# Patient Record
Sex: FEMALE | Race: WHITE | NOT HISPANIC OR LATINO | Employment: STUDENT | URBAN - METROPOLITAN AREA
[De-identification: names, ages, dates, MRNs, and addresses within clinical notes are randomized per-mention and may not be internally consistent; named-entity substitution may affect disease eponyms.]

---
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
## 2021-08-11 ENCOUNTER — APPOINTMENT (OUTPATIENT)
Dept: ULTRASOUND IMAGING | Facility: HOSPITAL | Age: 21
End: 2021-08-11

## 2021-08-11 ENCOUNTER — APPOINTMENT (OUTPATIENT)
Dept: CT IMAGING | Facility: HOSPITAL | Age: 21
End: 2021-08-11

## 2021-08-11 ENCOUNTER — HOSPITAL ENCOUNTER (EMERGENCY)
Facility: HOSPITAL | Age: 21
Discharge: HOME | End: 2021-08-11
Attending: EMERGENCY MEDICINE | Admitting: EMERGENCY MEDICINE

## 2021-08-11 VITALS
TEMPERATURE: 98.4 F | OXYGEN SATURATION: 100 % | HEART RATE: 88 BPM | SYSTOLIC BLOOD PRESSURE: 105 MMHG | DIASTOLIC BLOOD PRESSURE: 63 MMHG | RESPIRATION RATE: 16 BRPM

## 2021-08-11 DIAGNOSIS — R10.31 RIGHT LOWER QUADRANT ABDOMINAL PAIN: Primary | ICD-10-CM

## 2021-08-11 LAB
ALBUMIN SERPL-MCNC: 4.2 G/DL (ref 3.5–5.2)
ALBUMIN/GLOB SERPL: 1.5 G/DL
ALP SERPL-CCNC: 104 U/L (ref 39–117)
ALT SERPL W P-5'-P-CCNC: 19 U/L (ref 1–33)
ANION GAP SERPL CALCULATED.3IONS-SCNC: 9.8 MMOL/L (ref 5–15)
AST SERPL-CCNC: 24 U/L (ref 1–32)
BASOPHILS # BLD AUTO: 0.05 10*3/MM3 (ref 0–0.2)
BASOPHILS NFR BLD AUTO: 0.5 % (ref 0–1.5)
BILIRUB SERPL-MCNC: 0.4 MG/DL (ref 0–1.2)
BILIRUB UR QL STRIP: NEGATIVE
BUN SERPL-MCNC: 11 MG/DL (ref 6–20)
BUN/CREAT SERPL: 13.4 (ref 7–25)
CALCIUM SPEC-SCNC: 9.3 MG/DL (ref 8.6–10.5)
CHLORIDE SERPL-SCNC: 102 MMOL/L (ref 98–107)
CLARITY UR: ABNORMAL
CO2 SERPL-SCNC: 26.2 MMOL/L (ref 22–29)
COLOR UR: YELLOW
CREAT SERPL-MCNC: 0.82 MG/DL (ref 0.57–1)
DEPRECATED RDW RBC AUTO: 38.1 FL (ref 37–54)
EOSINOPHIL # BLD AUTO: 0.08 10*3/MM3 (ref 0–0.4)
EOSINOPHIL NFR BLD AUTO: 0.8 % (ref 0.3–6.2)
ERYTHROCYTE [DISTWIDTH] IN BLOOD BY AUTOMATED COUNT: 12 % (ref 12.3–15.4)
GFR SERPL CREATININE-BSD FRML MDRD: 89 ML/MIN/1.73
GLOBULIN UR ELPH-MCNC: 2.8 GM/DL
GLUCOSE SERPL-MCNC: 84 MG/DL (ref 65–99)
GLUCOSE UR STRIP-MCNC: NEGATIVE MG/DL
HCG SERPL QL: NEGATIVE
HCT VFR BLD AUTO: 40 % (ref 34–46.6)
HGB BLD-MCNC: 13.7 G/DL (ref 12–15.9)
HGB UR QL STRIP.AUTO: NEGATIVE
IMM GRANULOCYTES # BLD AUTO: 0.04 10*3/MM3 (ref 0–0.05)
IMM GRANULOCYTES NFR BLD AUTO: 0.4 % (ref 0–0.5)
KETONES UR QL STRIP: ABNORMAL
LEUKOCYTE ESTERASE UR QL STRIP.AUTO: NEGATIVE
LYMPHOCYTES # BLD AUTO: 2.35 10*3/MM3 (ref 0.7–3.1)
LYMPHOCYTES NFR BLD AUTO: 22.4 % (ref 19.6–45.3)
MCH RBC QN AUTO: 30.2 PG (ref 26.6–33)
MCHC RBC AUTO-ENTMCNC: 34.3 G/DL (ref 31.5–35.7)
MCV RBC AUTO: 88.1 FL (ref 79–97)
MONOCYTES # BLD AUTO: 0.86 10*3/MM3 (ref 0.1–0.9)
MONOCYTES NFR BLD AUTO: 8.2 % (ref 5–12)
NEUTROPHILS NFR BLD AUTO: 67.7 % (ref 42.7–76)
NEUTROPHILS NFR BLD AUTO: 7.1 10*3/MM3 (ref 1.7–7)
NITRITE UR QL STRIP: NEGATIVE
NRBC BLD AUTO-RTO: 0 /100 WBC (ref 0–0.2)
PH UR STRIP.AUTO: 6.5 [PH] (ref 5–8)
PLATELET # BLD AUTO: 323 10*3/MM3 (ref 140–450)
PMV BLD AUTO: 8.6 FL (ref 6–12)
POTASSIUM SERPL-SCNC: 3.7 MMOL/L (ref 3.5–5.2)
PROT SERPL-MCNC: 7 G/DL (ref 6–8.5)
PROT UR QL STRIP: NEGATIVE
RBC # BLD AUTO: 4.54 10*6/MM3 (ref 3.77–5.28)
SODIUM SERPL-SCNC: 138 MMOL/L (ref 136–145)
SP GR UR STRIP: >=1.03 (ref 1–1.03)
UROBILINOGEN UR QL STRIP: ABNORMAL
WBC # BLD AUTO: 10.48 10*3/MM3 (ref 3.4–10.8)

## 2021-08-11 PROCEDURE — 25010000002 IOPAMIDOL 61 % SOLUTION: Performed by: EMERGENCY MEDICINE

## 2021-08-11 PROCEDURE — 84703 CHORIONIC GONADOTROPIN ASSAY: CPT | Performed by: PHYSICIAN ASSISTANT

## 2021-08-11 PROCEDURE — 76856 US EXAM PELVIC COMPLETE: CPT

## 2021-08-11 PROCEDURE — 25010000002 KETOROLAC TROMETHAMINE PER 15 MG: Performed by: EMERGENCY MEDICINE

## 2021-08-11 PROCEDURE — 74177 CT ABD & PELVIS W/CONTRAST: CPT

## 2021-08-11 PROCEDURE — 81003 URINALYSIS AUTO W/O SCOPE: CPT | Performed by: PHYSICIAN ASSISTANT

## 2021-08-11 PROCEDURE — 96374 THER/PROPH/DIAG INJ IV PUSH: CPT

## 2021-08-11 PROCEDURE — 99283 EMERGENCY DEPT VISIT LOW MDM: CPT

## 2021-08-11 PROCEDURE — 85025 COMPLETE CBC W/AUTO DIFF WBC: CPT | Performed by: PHYSICIAN ASSISTANT

## 2021-08-11 PROCEDURE — 93976 VASCULAR STUDY: CPT

## 2021-08-11 PROCEDURE — 80053 COMPREHEN METABOLIC PANEL: CPT | Performed by: PHYSICIAN ASSISTANT

## 2021-08-11 RX ORDER — SODIUM CHLORIDE 0.9 % (FLUSH) 0.9 %
10 SYRINGE (ML) INJECTION AS NEEDED
Status: DISCONTINUED | OUTPATIENT
Start: 2021-08-11 | End: 2021-08-11 | Stop reason: HOSPADM

## 2021-08-11 RX ORDER — KETOROLAC TROMETHAMINE 15 MG/ML
15 INJECTION, SOLUTION INTRAMUSCULAR; INTRAVENOUS ONCE
Status: COMPLETED | OUTPATIENT
Start: 2021-08-11 | End: 2021-08-11

## 2021-08-11 RX ADMIN — KETOROLAC TROMETHAMINE 15 MG: 15 INJECTION, SOLUTION INTRAMUSCULAR; INTRAVENOUS at 18:35

## 2021-08-11 RX ADMIN — IOPAMIDOL 100 ML: 612 INJECTION, SOLUTION INTRAVENOUS at 17:51

## 2021-08-11 RX ADMIN — SODIUM CHLORIDE, PRESERVATIVE FREE 10 ML: 5 INJECTION INTRAVENOUS at 18:35

## 2021-08-11 RX ADMIN — SODIUM CHLORIDE 1000 ML: 9 INJECTION, SOLUTION INTRAVENOUS at 17:00

## 2021-08-11 NOTE — ED TRIAGE NOTES
Pt reports right lower abd pain that started Sunday. Pt was seen at her PCP and was sent here to rule out appendicitis.     Pt was wearing a mask during assessment.  This RN wore appropriate PPE

## 2021-08-11 NOTE — ED PROVIDER NOTES
Pt presents to the ED c/o  right lower quadrant abdominal pain onset 5 days ago.  She initially had some nausea and vomiting.  She denies diarrhea.  She believes she may have had a fever on Sunday but did not take her temperature.  She denies vaginal bleeding.     On exam,   Awake and alert, no acute distress.  There is focal right lower quadrant tenderness without rebound or guarding.     Plan: CT abdomen reviewed by me in PACS and demonstrates normal appendix.  I explained patient that we will obtain a pelvic ultrasound to evaluate for possible ovarian pathology contributing to her symptoms.      I wore a mask, face shield, and gloves during this patient encounter.  Patient also wearing a surgical mask.  Hand hygeine performed before and after seeing the patient.     Attestation:  The APOLONIA and I have discussed this patient's history, physical exam, and treatment plan.  I have reviewed the documentation and personally had a face to face interaction with the patient. I affirm the documentation and agree with the treatment and plan.  The attached note describes my personal findings.            Abhishek Trammell MD  08/11/21 9774

## 2021-08-11 NOTE — ED PROVIDER NOTES
EMERGENCY DEPARTMENT ENCOUNTER    Room Number:  19/19  Date of encounter:  8/11/2021  PCP: Diana Askew MD  Historian: Patient, mother      I used full protective equipment while examining this patient.  This includes face mask, gloves and protective eyewear.  I washed my hands before entering the room and immediately upon leaving the room      HPI:  Chief Complaint: Abdominal pain  A complete HPI/ROS/PMH/PSH/SH/FH are unobtainable due to: Nothing    Context: Monica Cerna is a 21 y.o. female who presents to the ED c/o 5-day history of gradual onset abdominal pain.  Patient states the pain initially was periumbilical in nature, and has since migrated to the right lower quadrant.  The pain is moderate, intermittent, dull in nature.  Patient has a associated intermittent nausea and vomiting.  She denies any dysuria, diarrhea, vaginal bleeding, vaginal discharge.  Patient denies any previous abdominal surgeries.  She was initially seen at her primary care doctor's office and sent here for further evaluation.    Review of Medical Records  I reviewed patient's last office visit from orthopedics.  Patient was seen on 8/4/2021 for right knee arthroscopy.    PAST MEDICAL HISTORY  Active Ambulatory Problems     Diagnosis Date Noted   • No Active Ambulatory Problems     Resolved Ambulatory Problems     Diagnosis Date Noted   • No Resolved Ambulatory Problems     No Additional Past Medical History         PAST SURGICAL HISTORY  No past surgical history on file.      FAMILY HISTORY  No family history on file.      SOCIAL HISTORY  Social History     Socioeconomic History   • Marital status: Single     Spouse name: Not on file   • Number of children: Not on file   • Years of education: Not on file   • Highest education level: Not on file         ALLERGIES  Patient has no known allergies.        REVIEW OF SYSTEMS  All systems reviewed and negative except for those discussed in HPI.       PHYSICAL EXAM    I have reviewed the  triage vital signs and nursing notes.    ED Triage Vitals   Temp Heart Rate Resp BP SpO2   08/11/21 1523 08/11/21 1523 08/11/21 1523 08/11/21 1525 08/11/21 1523   98.4 °F (36.9 °C) 113 16 101/76 98 %      Temp src Heart Rate Source Patient Position BP Location FiO2 (%)   08/11/21 1523 08/11/21 1523 -- 08/11/21 1525 --   Tympanic Monitor  Left arm        Physical Exam  GENERAL: Alert, oriented, not distressed  HENT: head atraumatic, no nuchal rigidity  EYES: no scleral icterus, EOMI  CV: regular rhythm, regular rate, no murmur  RESPIRATORY: normal effort, CTA  ABDOMEN: soft, moderate right lower quadrant abdominal tenderness without guarding or rebound.  Tender at McBurney's point.  Normal bowel sounds.  No CVA tenderness.  MUSCULOSKELETAL: no deformity, FROM, no calf swelling or tenderness  NEURO: alert, moves all extremities, follows commands  SKIN: warm, dry        LAB RESULTS  Recent Results (from the past 24 hour(s))   Comprehensive Metabolic Panel    Collection Time: 08/11/21  4:19 PM    Specimen: Blood   Result Value Ref Range    Glucose 84 65 - 99 mg/dL    BUN 11 6 - 20 mg/dL    Creatinine 0.82 0.57 - 1.00 mg/dL    Sodium 138 136 - 145 mmol/L    Potassium 3.7 3.5 - 5.2 mmol/L    Chloride 102 98 - 107 mmol/L    CO2 26.2 22.0 - 29.0 mmol/L    Calcium 9.3 8.6 - 10.5 mg/dL    Total Protein 7.0 6.0 - 8.5 g/dL    Albumin 4.20 3.50 - 5.20 g/dL    ALT (SGPT) 19 1 - 33 U/L    AST (SGOT) 24 1 - 32 U/L    Alkaline Phosphatase 104 39 - 117 U/L    Total Bilirubin 0.4 0.0 - 1.2 mg/dL    eGFR Non African Amer 89 >60 mL/min/1.73    Globulin 2.8 gm/dL    A/G Ratio 1.5 g/dL    BUN/Creatinine Ratio 13.4 7.0 - 25.0    Anion Gap 9.8 5.0 - 15.0 mmol/L   hCG, Serum, Qualitative    Collection Time: 08/11/21  4:19 PM    Specimen: Blood   Result Value Ref Range    HCG Qualitative Negative Negative   CBC Auto Differential    Collection Time: 08/11/21  4:19 PM    Specimen: Blood   Result Value Ref Range    WBC 10.48 3.40 - 10.80  10*3/mm3    RBC 4.54 3.77 - 5.28 10*6/mm3    Hemoglobin 13.7 12.0 - 15.9 g/dL    Hematocrit 40.0 34.0 - 46.6 %    MCV 88.1 79.0 - 97.0 fL    MCH 30.2 26.6 - 33.0 pg    MCHC 34.3 31.5 - 35.7 g/dL    RDW 12.0 (L) 12.3 - 15.4 %    RDW-SD 38.1 37.0 - 54.0 fl    MPV 8.6 6.0 - 12.0 fL    Platelets 323 140 - 450 10*3/mm3    Neutrophil % 67.7 42.7 - 76.0 %    Lymphocyte % 22.4 19.6 - 45.3 %    Monocyte % 8.2 5.0 - 12.0 %    Eosinophil % 0.8 0.3 - 6.2 %    Basophil % 0.5 0.0 - 1.5 %    Immature Grans % 0.4 0.0 - 0.5 %    Neutrophils, Absolute 7.10 (H) 1.70 - 7.00 10*3/mm3    Lymphocytes, Absolute 2.35 0.70 - 3.10 10*3/mm3    Monocytes, Absolute 0.86 0.10 - 0.90 10*3/mm3    Eosinophils, Absolute 0.08 0.00 - 0.40 10*3/mm3    Basophils, Absolute 0.05 0.00 - 0.20 10*3/mm3    Immature Grans, Absolute 0.04 0.00 - 0.05 10*3/mm3    nRBC 0.0 0.0 - 0.2 /100 WBC   Urinalysis With Microscopic If Indicated (No Culture) - Urine, Clean Catch    Collection Time: 08/11/21  8:27 PM    Specimen: Urine, Clean Catch   Result Value Ref Range    Color, UA Yellow Yellow, Straw    Appearance, UA Cloudy (A) Clear    pH, UA 6.5 5.0 - 8.0    Specific Gravity, UA >=1.030 1.005 - 1.030    Glucose, UA Negative Negative    Ketones, UA 40 mg/dL (2+) (A) Negative    Bilirubin, UA Negative Negative    Blood, UA Negative Negative    Protein, UA Negative Negative    Leuk Esterase, UA Negative Negative    Nitrite, UA Negative Negative    Urobilinogen, UA 0.2 E.U./dL 0.2 - 1.0 E.U./dL       Ordered the above labs and independently reviewed the results.        RADIOLOGY  US Pelvis Complete, US Testicular or Ovarian Vascular Limited    Result Date: 8/11/2021  US PELVIS COMPLETE-, US TESTICULAR OR OVARIAN VASCULAR LIMITED-  INDICATIONS: Abdominal pain  TECHNIQUE: Transabdominal pelvic ultrasound with Doppler assessment of the ovaries  COMPARISON: Correlated with CT exam from 08/11/2021  FINDINGS:  The retroverted uterus measures 8.4 x 3.3 x 6.1 cm. Endometrial  thickness was measured at 1 mm. The cervix appears unremarkable.  The ovaries show normal vascularity. The right ovary measures 3.7 x 2.3 x 1.8 cm, and appears unremarkable. The left ovary measures 4.0 x 2.7 x 2.3 cm, and contains a 2.4 cm cyst.  Small pelvic free fluid at the right adnexa.       Small right adnexal free fluid. Left ovarian cyst. Otherwise unremarkable pelvic ultrasound.  This report was finalized on 8/11/2021 7:21 PM by Dr. Favio Pereyra M.D.      CT Abdomen Pelvis With Contrast    Result Date: 8/11/2021  CT ABDOMEN PELVIS W CONTRAST-  CLINICAL HISTORY: Right lower quadrant pain. Vomiting.  TECHNIQUE: Spiral CT images were acquired through the abdomen and pelvis with IV contrast only and were reconstructed in 3 mm thick slices.  Radiation dose reduction techniques were utilized, including automated exposure control and exposure modulation based on body size.  COMPARISON: None  FINDINGS: The liver, spleen, pancreas, kidneys, and adrenal glands appear within normal limits. The stomach and small and large bowel are unremarkable. There is no bowel distention. The appendix is identified, and appears normal. The uterus and ovaries appear within normal limits.  IMPRESSIONS: Normal CT scan of the abdomen and pelvis.  This report was finalized on 8/11/2021 6:26 PM by Dr. Ronak Johnson M.D.        I ordered the above noted radiological studies. Reviewed by me and discussed with radiologist.  See dictation for official radiology interpretation.      MEDICATIONS GIVEN IN ER    Medications   sodium chloride 0.9 % flush 10 mL (10 mL Intravenous Given 8/11/21 1835)   sodium chloride 0.9 % bolus 1,000 mL (0 mL Intravenous Stopped 8/11/21 1730)   iopamidol (ISOVUE-300) 61 % injection 100 mL (100 mL Intravenous Given 8/11/21 1751)   ketorolac (TORADOL) injection 15 mg (15 mg Intravenous Given 8/11/21 1835)         PROGRESS, DATA ANALYSIS, CONSULTS, AND MEDICAL DECISION MAKING    All labs have been  independently reviewed by me.  All radiology studies have been reviewed by me and discussed with radiologist dictating the report.   EKG's independently viewed and interpreted by me.  Discussion below represents my analysis of pertinent findings related to patient's condition, differential diagnosis, treatment plan and final disposition.    I have discussed case with Dr. Trammell, emergency room physician.  He has performed his own bedside examination and agrees with treatment plan.    ED Course as of Aug 11 2210   Wed Aug 11, 2021   1614 Patient presents with 4-day history of right lower quadrant abdominal pain, vomiting.  Differential diagnoses include not limited to acute appendicitis, colitis, ureterolithiasis, UTI, epiploic appendagitis, acute cholecystitis.    Patient declines pain medicine at this time.    [EE]   1634 WBC: 10.48 [EE]   1634 Hemoglobin: 13.7 [EE]   1712 Creatinine: 0.82 [EE]   1712 Total Bilirubin: 0.4 [EE]   1712 HCG Qualitative: Negative [EE]   1830 I discussed CT findings with Dr. Chan.  Patient has no acute intra-abdominal abnormalities.  No evidence of appendicitis.    Given this plan to order ultrasound to rule out ovarian torsion/cyst.    [EE]   2109 Ultrasound and urine showed no acute abnormalities.  No clear etiology patient symptoms.  She has been given strict return to ER directions.  Patient and family in agreement with treatment plan.    [EE]      ED Course User Index  [EE] Janusz Peterson PA       AS OF 22:10 EDT VITALS:    BP - 105/63  HR - 88  TEMP - 98.4 °F (36.9 °C) (Tympanic)  O2 SATS - 100%        DIAGNOSIS  Final diagnoses:   Right lower quadrant abdominal pain         DISPOSITION  Discharged           Janusz Peterson PA  08/11/21 2211

## 2021-09-14 NOTE — PROGRESS NOTES
"Chief Complaint   Patient presents with   • Abdominal Pain     ONSET 8/2021   • Vomiting           History of Present Illness  Patient is a 21-year-old female who presents today for evaluation.    Patient presents today with concerns about abdominal pain, nausea, and vomiting.  She reports symptoms began in August 2021.  She reports pain present to her right upper quadrant.  She described as being achy.  It is constant.  She is also noted some discomfort to her left lower quadrant.  She reports anxiety can make the pain worse.  She has been experiencing nausea and vomiting as well.  She reports she has not vomited in the last 2 weeks but prior to that was vomiting at least once a week.  She has had poor appetite, not been eating as much, due to the symptoms and reports an 8 to 10 pound weight loss.    She reports her bowels are moving normally, generally once per day.  Denies any diarrhea or constipation.  Denies any rectal bleeding.    Denies any prior abdominal surgeries.    She has been taking NSAIDs regularly since June 2021 when she had knee surgery.  Has been taking ibuprofen 800 mg.    She was seen in the emergency room for the symptoms August 11, 2021.  CT scan was performed and it was normal.  Pelvic ultrasound was performed and showed a left ovarian cyst but was otherwise normal.  Lab work last normal.  She was discharged home.    Review of Systems   Constitutional: Positive for appetite change and unexpected weight change. Negative for fever.   Gastrointestinal: Negative for blood in stool.         Result Review :       Records reviewed as summarized in HPI.    Vital Signs:   /78 (BP Location: Left arm, Patient Position: Sitting, Cuff Size: Adult)   Pulse 99   Temp 97.5 °F (36.4 °C) (Temporal)   Ht 162.6 cm (64\")   Wt 72.5 kg (159 lb 14.4 oz)   SpO2 98%   BMI 27.45 kg/m²     Body mass index is 27.45 kg/m².     Physical Exam  Vitals reviewed.   Constitutional:       General: She is not in acute " distress.     Appearance: She is well-developed.   HENT:      Head: Normocephalic and atraumatic.   Pulmonary:      Effort: Pulmonary effort is normal. No respiratory distress.   Abdominal:      General: Abdomen is flat. Bowel sounds are normal. There is no distension.      Palpations: Abdomen is soft.      Tenderness: There is abdominal tenderness in the right upper quadrant.   Skin:     General: Skin is dry.      Coloration: Skin is not pale.   Neurological:      Mental Status: She is alert and oriented to person, place, and time.   Psychiatric:         Thought Content: Thought content normal.           Assessment and Plan    Diagnoses and all orders for this visit:    1. Right upper quadrant abdominal pain (Primary)  -     US Abdomen Limited; Future    2. Nausea and vomiting, intractability of vomiting not specified, unspecified vomiting type    3. Left lower quadrant abdominal pain    Other orders  -     pantoprazole (PROTONIX) 40 MG EC tablet; Take 1 tablet by mouth Daily.  Dispense: 30 tablet; Refill: 2         Discussion  Patient presents today with new complaint of abdominal pain with nausea and vomiting.  We will schedule right upper quadrant ultrasound to evaluate the gallbladder and schedule EGD to assess for any evidence of peptic ulcer disease or celiac disease.  Discussed concern for possible ulcer secondary to NSAID use as etiology of symptoms.  Will initiate treatment with proton pump inhibitor.          Follow Up   Return for Follow up to review results after testing complete.    Patient Instructions   Schedule RUQ ultrasound to evaluate the gallbladder.    Schedule EGD for further evaluation of symptoms.    Start taking pantoprazole daily as prescribed.    Recommend limiting NSAID medications including ibuprofen as much as possible.    Recommend follow-up with gynecologist for left ovarian cyst.

## 2021-09-17 ENCOUNTER — PATIENT ROUNDING (BHMG ONLY) (OUTPATIENT)
Dept: GASTROENTEROLOGY | Facility: CLINIC | Age: 21
End: 2021-09-17

## 2021-09-17 ENCOUNTER — PREP FOR SURGERY (OUTPATIENT)
Dept: SURGERY | Facility: SURGERY CENTER | Age: 21
End: 2021-09-17

## 2021-09-17 ENCOUNTER — HOSPITAL ENCOUNTER (OUTPATIENT)
Dept: ULTRASOUND IMAGING | Facility: HOSPITAL | Age: 21
Discharge: HOME | End: 2021-09-17
Admitting: NURSE PRACTITIONER

## 2021-09-17 ENCOUNTER — OFFICE VISIT (OUTPATIENT)
Dept: GASTROENTEROLOGY | Facility: CLINIC | Age: 21
End: 2021-09-17

## 2021-09-17 ENCOUNTER — TRANSCRIBE ORDERS (OUTPATIENT)
Dept: LAB | Facility: SURGERY CENTER | Age: 21
End: 2021-09-17

## 2021-09-17 VITALS
SYSTOLIC BLOOD PRESSURE: 106 MMHG | HEART RATE: 99 BPM | TEMPERATURE: 97.5 F | DIASTOLIC BLOOD PRESSURE: 78 MMHG | OXYGEN SATURATION: 98 % | BODY MASS INDEX: 27.3 KG/M2 | HEIGHT: 64 IN | WEIGHT: 159.9 LBS

## 2021-09-17 DIAGNOSIS — R10.11 RIGHT UPPER QUADRANT ABDOMINAL PAIN: ICD-10-CM

## 2021-09-17 DIAGNOSIS — R11.2 NAUSEA AND VOMITING, INTRACTABILITY OF VOMITING NOT SPECIFIED, UNSPECIFIED VOMITING TYPE: ICD-10-CM

## 2021-09-17 DIAGNOSIS — R10.11 RIGHT UPPER QUADRANT ABDOMINAL PAIN: Primary | ICD-10-CM

## 2021-09-17 DIAGNOSIS — Z01.818 OTHER SPECIFIED PRE-OPERATIVE EXAMINATION: Primary | ICD-10-CM

## 2021-09-17 DIAGNOSIS — R10.32 LEFT LOWER QUADRANT ABDOMINAL PAIN: ICD-10-CM

## 2021-09-17 PROCEDURE — 76705 ECHO EXAM OF ABDOMEN: CPT

## 2021-09-17 PROCEDURE — 99204 OFFICE O/P NEW MOD 45 MIN: CPT | Performed by: NURSE PRACTITIONER

## 2021-09-17 RX ORDER — SODIUM CHLORIDE 0.9 % (FLUSH) 0.9 %
3 SYRINGE (ML) INJECTION EVERY 12 HOURS SCHEDULED
Status: CANCELLED | OUTPATIENT
Start: 2021-09-17

## 2021-09-17 RX ORDER — SODIUM CHLORIDE, SODIUM LACTATE, POTASSIUM CHLORIDE, CALCIUM CHLORIDE 600; 310; 30; 20 MG/100ML; MG/100ML; MG/100ML; MG/100ML
30 INJECTION, SOLUTION INTRAVENOUS CONTINUOUS PRN
Status: CANCELLED | OUTPATIENT
Start: 2021-09-17

## 2021-09-17 RX ORDER — SODIUM CHLORIDE 0.9 % (FLUSH) 0.9 %
10 SYRINGE (ML) INJECTION AS NEEDED
Status: CANCELLED | OUTPATIENT
Start: 2021-09-17

## 2021-09-17 RX ORDER — GUAIFENESIN, PSEUDOEPHEDRINE HYDROCHLORIDE 600; 60 MG/1; MG/1
1 TABLET, EXTENDED RELEASE ORAL EVERY 12 HOURS
COMMUNITY
End: 2021-09-21

## 2021-09-17 RX ORDER — FAMOTIDINE 40 MG/1
40 TABLET, FILM COATED ORAL DAILY
COMMUNITY
Start: 2021-09-08

## 2021-09-17 RX ORDER — PANTOPRAZOLE SODIUM 40 MG/1
40 TABLET, DELAYED RELEASE ORAL DAILY
Qty: 30 TABLET | Refills: 2 | Status: SHIPPED | OUTPATIENT
Start: 2021-09-17

## 2021-09-17 RX ORDER — PROMETHAZINE HYDROCHLORIDE 25 MG/1
TABLET ORAL DAILY PRN
COMMUNITY
Start: 2021-08-09

## 2021-09-17 NOTE — PATIENT INSTRUCTIONS
Schedule RUQ ultrasound to evaluate the gallbladder.    Schedule EGD for further evaluation of symptoms.    Start taking pantoprazole daily as prescribed.    Recommend limiting NSAID medications including ibuprofen as much as possible.    Recommend follow-up with gynecologist for left ovarian cyst.

## 2021-09-18 NOTE — PROGRESS NOTES
September 17, 2021    ** In person patient rounding **     Monica Cerna?    Verified date of birth? 2000    Welcomed Monica to the Practice    Tell me about your visit with us. What things went well?  Monica states she is very happy with her visit with us, she was able to get an appt very quickly and was very satisfied with everything    We're always looking for ways to make our patients' experiences even better. Do you have recommendations on ways we may improve?  No suggestions for improvement    Overall were you satisfied with your first visit to our practice? YES

## 2021-09-20 ENCOUNTER — LAB (OUTPATIENT)
Dept: LAB | Facility: SURGERY CENTER | Age: 21
End: 2021-09-20

## 2021-09-20 DIAGNOSIS — Z01.818 OTHER SPECIFIED PRE-OPERATIVE EXAMINATION: ICD-10-CM

## 2021-09-20 LAB — SARS-COV-2 ORF1AB RESP QL NAA+PROBE: NOT DETECTED

## 2021-09-20 PROCEDURE — C9803 HOPD COVID-19 SPEC COLLECT: HCPCS

## 2021-09-20 PROCEDURE — U0004 COV-19 TEST NON-CDC HGH THRU: HCPCS | Performed by: SURGERY

## 2021-09-21 NOTE — SIGNIFICANT NOTE
Education provided the Patient on the following:    - Nothing to Eat or Drink after MN the night before the procedure  -Your required COVID Test is Scheduled on    9/20      Between the Hours of 1523-7801  -You will only be notified if your COVID test Result is POSITIVE  -The importance of reducing your number of contacts by self quarantining after you COVID test until the date of your EGD  -You will need to have someone drive you home after your EGD and remain with you for 24 hours after the EGD  - The date of your EGD, your are welcome to have one visitor at bedside or remain within 10-15 minutes of [facility]  -Please wear warm socks when you arrive for your EGD  -Remove all jewelry and leave any valuables before arriving on the date of your procedure (all will have to be removed before leaving preop)  -You will need to arrive at       1100    on          9/23 for your EGD  -Feel free to contact us at: [phone] with any additional questions/concerns

## 2021-09-22 ENCOUNTER — ANESTHESIA (OUTPATIENT)
Dept: SURGERY | Facility: SURGERY CENTER | Age: 21
End: 2021-09-22

## 2021-09-22 ENCOUNTER — ANESTHESIA EVENT (OUTPATIENT)
Dept: SURGERY | Facility: SURGERY CENTER | Age: 21
End: 2021-09-22

## 2021-09-22 ENCOUNTER — HOSPITAL ENCOUNTER (OUTPATIENT)
Facility: SURGERY CENTER | Age: 21
Setting detail: HOSPITAL OUTPATIENT SURGERY
Discharge: HOME | End: 2021-09-22
Attending: INTERNAL MEDICINE | Admitting: INTERNAL MEDICINE

## 2021-09-22 VITALS
HEIGHT: 64 IN | BODY MASS INDEX: 27.55 KG/M2 | RESPIRATION RATE: 16 BRPM | TEMPERATURE: 98.1 F | WEIGHT: 161.4 LBS | OXYGEN SATURATION: 99 % | SYSTOLIC BLOOD PRESSURE: 100 MMHG | HEART RATE: 58 BPM | DIASTOLIC BLOOD PRESSURE: 68 MMHG

## 2021-09-22 DIAGNOSIS — R11.2 NAUSEA AND VOMITING, INTRACTABILITY OF VOMITING NOT SPECIFIED, UNSPECIFIED VOMITING TYPE: ICD-10-CM

## 2021-09-22 DIAGNOSIS — R10.11 RIGHT UPPER QUADRANT ABDOMINAL PAIN: ICD-10-CM

## 2021-09-22 LAB
B-HCG UR QL: NEGATIVE
INTERNAL NEGATIVE CONTROL: NEGATIVE
INTERNAL POSITIVE CONTROL: POSITIVE
Lab: NORMAL

## 2021-09-22 PROCEDURE — 0DB98ZX EXCISION OF DUODENUM, VIA NATURAL OR ARTIFICIAL OPENING ENDOSCOPIC, DIAGNOSTIC: ICD-10-PCS | Performed by: NURSE PRACTITIONER

## 2021-09-22 PROCEDURE — 25010000002 PROPOFOL 10 MG/ML EMULSION: Performed by: NURSE ANESTHETIST, CERTIFIED REGISTERED

## 2021-09-22 PROCEDURE — 81025 URINE PREGNANCY TEST: CPT | Performed by: NURSE PRACTITIONER

## 2021-09-22 PROCEDURE — 88305 TISSUE EXAM BY PATHOLOGIST: CPT | Performed by: INTERNAL MEDICINE

## 2021-09-22 PROCEDURE — 43239 EGD BIOPSY SINGLE/MULTIPLE: CPT | Performed by: INTERNAL MEDICINE

## 2021-09-22 PROCEDURE — 87081 CULTURE SCREEN ONLY: CPT | Performed by: INTERNAL MEDICINE

## 2021-09-22 RX ORDER — LIDOCAINE HYDROCHLORIDE 10 MG/ML
0.5 INJECTION, SOLUTION INFILTRATION; PERINEURAL ONCE AS NEEDED
Status: DISCONTINUED | OUTPATIENT
Start: 2021-09-22 | End: 2021-09-22 | Stop reason: HOSPADM

## 2021-09-22 RX ORDER — LIDOCAINE HYDROCHLORIDE 20 MG/ML
INJECTION, SOLUTION INFILTRATION; PERINEURAL AS NEEDED
Status: DISCONTINUED | OUTPATIENT
Start: 2021-09-22 | End: 2021-09-22 | Stop reason: SURG

## 2021-09-22 RX ORDER — MAGNESIUM HYDROXIDE 1200 MG/15ML
LIQUID ORAL AS NEEDED
Status: DISCONTINUED | OUTPATIENT
Start: 2021-09-22 | End: 2021-09-22 | Stop reason: HOSPADM

## 2021-09-22 RX ORDER — PROPOFOL 10 MG/ML
VIAL (ML) INTRAVENOUS AS NEEDED
Status: DISCONTINUED | OUTPATIENT
Start: 2021-09-22 | End: 2021-09-22 | Stop reason: SURG

## 2021-09-22 RX ORDER — LEVOCETIRIZINE DIHYDROCHLORIDE 5 MG/1
5 TABLET, FILM COATED ORAL DAILY
COMMUNITY

## 2021-09-22 RX ORDER — SODIUM CHLORIDE, SODIUM LACTATE, POTASSIUM CHLORIDE, CALCIUM CHLORIDE 600; 310; 30; 20 MG/100ML; MG/100ML; MG/100ML; MG/100ML
1000 INJECTION, SOLUTION INTRAVENOUS CONTINUOUS
Status: DISCONTINUED | OUTPATIENT
Start: 2021-09-22 | End: 2021-09-22 | Stop reason: HOSPADM

## 2021-09-22 RX ORDER — SODIUM CHLORIDE 0.9 % (FLUSH) 0.9 %
10 SYRINGE (ML) INJECTION AS NEEDED
Status: DISCONTINUED | OUTPATIENT
Start: 2021-09-22 | End: 2021-09-22 | Stop reason: HOSPADM

## 2021-09-22 RX ORDER — SODIUM CHLORIDE 0.9 % (FLUSH) 0.9 %
3 SYRINGE (ML) INJECTION EVERY 12 HOURS SCHEDULED
Status: DISCONTINUED | OUTPATIENT
Start: 2021-09-22 | End: 2021-09-22 | Stop reason: HOSPADM

## 2021-09-22 RX ADMIN — SODIUM CHLORIDE, POTASSIUM CHLORIDE, SODIUM LACTATE AND CALCIUM CHLORIDE 1000 ML: 600; 310; 30; 20 INJECTION, SOLUTION INTRAVENOUS at 11:14

## 2021-09-22 RX ADMIN — PROPOFOL 180 MCG/KG/MIN: 10 INJECTION, EMULSION INTRAVENOUS at 11:50

## 2021-09-22 RX ADMIN — LIDOCAINE HYDROCHLORIDE 100 MG: 20 INJECTION, SOLUTION INFILTRATION; PERINEURAL at 11:50

## 2021-09-22 RX ADMIN — GLYCOPYRROLATE 0.1 MG: 0.2 INJECTION, SOLUTION INTRAMUSCULAR; INTRAVITREAL at 11:50

## 2021-09-22 RX ADMIN — PROPOFOL 100 MG: 10 INJECTION, EMULSION INTRAVENOUS at 11:50

## 2021-09-22 NOTE — H&P
No chief complaint on file.      HPI  ruq pain   Nausea and vomiting         Problem List:    Patient Active Problem List   Diagnosis   • Right upper quadrant abdominal pain   • Nausea and vomiting       Medical History:    Past Medical History:   Diagnosis Date   • GERD (gastroesophageal reflux disease)         Social History:    Social History     Socioeconomic History   • Marital status: Single     Spouse name: Not on file   • Number of children: Not on file   • Years of education: Not on file   • Highest education level: Not on file   Tobacco Use   • Smoking status: Never Smoker   • Smokeless tobacco: Never Used   Vaping Use   • Vaping Use: Never used   Substance and Sexual Activity   • Alcohol use: Never   • Sexual activity: Defer       Family History:   Family History   Problem Relation Age of Onset   • No Known Problems Mother    • No Known Problems Father        Surgical History:   Past Surgical History:   Procedure Laterality Date   • KNEE ARTHROSCOPY Right        No current facility-administered medications for this encounter.    Current Outpatient Medications:   •  famotidine (PEPCID) 40 MG tablet, Take 40 mg by mouth Daily., Disp: , Rfl:   •  pantoprazole (PROTONIX) 40 MG EC tablet, Take 1 tablet by mouth Daily., Disp: 30 tablet, Rfl: 2  •  promethazine (PHENERGAN) 25 MG tablet, Daily As Needed., Disp: , Rfl:     Allergies: No Known Allergies     The following portions of the patient's history were reviewed by me and updated as appropriate: review of systems, allergies, current medications, past family history, past medical history, past social history, past surgical history and problem list.    There were no vitals filed for this visit.    PHYSICAL EXAM:    CONSTITUTIONAL:  today's vital signs reviewed by me  GASTROINTESTINAL: abdomen is soft nontender nondistended with normal active bowel sounds, no masses are appreciated    Assessment/ Plan  ruq pain  Nausea and vomiting    egd     Risks and benefits  as well as alternatives to endoscopic evaluation were explained to the patient and they voiced understanding and wish to proceed.  These risks include but are not limited to the risk of bleeding, perforation, adverse reaction to sedation, and missed lesions.  The patient was given the opportunity to ask questions prior to the endoscopic procedure.

## 2021-09-22 NOTE — ANESTHESIA POSTPROCEDURE EVALUATION
Patient: Monica Cerna    Procedure Summary     Date: 09/22/21 Room / Location: [facility] OR  / [facility]    Anesthesia Start: 1143 Anesthesia Stop: 1157    Procedure: ESOPHAGOGASTRODUODENOSCOPY (N/A ) Diagnosis:       Right upper quadrant abdominal pain      Nausea and vomiting, intractability of vomiting not specified, unspecified vomiting type      (Right upper quadrant abdominal pain [R10.11])      (Nausea and vomiting, intractability of vomiting not specified, unspecified vomiting type [R11.2])    Surgeons: Román Monroy MD Provider: Nic Cruz MD    Anesthesia Type: MAC ASA Status: 2          Anesthesia Type: MAC    Vitals  Vitals Value Taken Time   /68 09/22/21 1210   Temp 36.7 °C (98.1 °F) 09/22/21 1200   Pulse 58 09/22/21 1210   Resp 16 09/22/21 1210   SpO2 99 % 09/22/21 1210           Post Anesthesia Care and Evaluation    Patient location during evaluation: bedside  Patient participation: complete - patient participated  Level of consciousness: responsive to light touch, responsive to verbal stimuli and sleepy but conscious  Pain score: 0  Pain management: adequate  Airway patency: patent  Anesthetic complications: No anesthetic complications  PONV Status: none  Cardiovascular status: acceptable and hemodynamically stable  Respiratory status: acceptable  Hydration status: acceptable

## 2021-09-22 NOTE — ANESTHESIA PREPROCEDURE EVALUATION
Anesthesia Evaluation     Patient summary reviewed and Nursing notes reviewed   no history of anesthetic complications:  NPO Solid Status: > 8 hours  NPO Liquid Status: > 2 hours           Airway   Mallampati: II  TM distance: >3 FB  Neck ROM: full  no difficulty expected  Dental - normal exam     Pulmonary - negative pulmonary ROS and normal exam   (-) COPD, asthma, not a smoker, lung cancer  Cardiovascular - negative cardio ROS and normal exam  Exercise tolerance: good (4-7 METS)    Rhythm: regular  Rate: normal    (-) hypertension, valvular problems/murmurs, past MI, CAD, dysrhythmias, angina, CHF, cardiac stents, CABG, pericardial effusion      Neuro/Psych- negative ROS  (-) seizures, TIA, CVA  GI/Hepatic/Renal/Endo    (+)  GERD poorly controlled,    (-) PUD, hepatitis, liver disease, no renal disease, diabetes, GI bleed, no thyroid disorder    Musculoskeletal (-) negative ROS    Abdominal  - normal exam   Substance History - negative use     OB/GYN negative ob/gyn ROS         Other - negative ROS                       Anesthesia Plan    ASA 2     MAC     intravenous induction     Anesthetic plan, all risks, benefits, and alternatives have been provided, discussed and informed consent has been obtained with: patient.    Plan discussed with CRNA.

## 2021-09-23 LAB
LAB AP CASE REPORT: NORMAL
PATH REPORT.FINAL DX SPEC: NORMAL
PATH REPORT.GROSS SPEC: NORMAL

## 2021-09-24 LAB — UREASE TISS QL: NEGATIVE

## 2021-10-01 ENCOUNTER — TELEPHONE (OUTPATIENT)
Dept: GASTROENTEROLOGY | Facility: CLINIC | Age: 21
End: 2021-10-01

## 2023-09-01 ENCOUNTER — OFFICE VISIT (OUTPATIENT)
Dept: INTERNAL MEDICINE | Facility: CLINIC | Age: 23
End: 2023-09-01
Payer: COMMERCIAL

## 2023-09-01 VITALS — TEMPERATURE: 98.2 F | HEIGHT: 64 IN | BODY MASS INDEX: 30.9 KG/M2 | WEIGHT: 181 LBS

## 2023-09-01 DIAGNOSIS — Z00.00 HEALTHCARE MAINTENANCE: Primary | ICD-10-CM

## 2023-09-01 PROCEDURE — 99385 PREV VISIT NEW AGE 18-39: CPT | Performed by: PHYSICIAN ASSISTANT

## 2023-09-01 RX ORDER — CETIRIZINE HYDROCHLORIDE 10 MG/1
10 TABLET ORAL DAILY
COMMUNITY

## 2023-09-01 RX ORDER — NORETHINDRONE ACETATE AND ETHINYL ESTRADIOL, AND FERROUS FUMARATE 1MG-20(24)
1 KIT ORAL DAILY
COMMUNITY
Start: 2023-06-29 | End: 2023-09-01 | Stop reason: SDUPTHER

## 2023-09-01 RX ORDER — NORETHINDRONE ACETATE AND ETHINYL ESTRADIOL, AND FERROUS FUMARATE 1MG-20(24)
1 KIT ORAL DAILY
Qty: 84 TABLET | Refills: 3 | Status: SHIPPED | OUTPATIENT
Start: 2023-09-01

## 2023-09-01 NOTE — PROGRESS NOTES
Subjective   Chief Complaint   Patient presents with    Establish Care    Annual Exam       History of Present Illness     Pt is here today for CPE and to establish care as a new pt. 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 Has no complaints today. Uses OCP, not sexually active. Rare etoh use.      Patient Active Problem List   Diagnosis    Right upper quadrant abdominal pain    Nausea and vomiting       No Known Allergies    Current Outpatient Medications on File Prior to Visit   Medication Sig Dispense Refill    cetirizine (zyrTEC) 10 MG tablet Take 1 tablet by mouth Daily.      [DISCONTINUED] Ritu 24 Fe 1-20 MG-MCG(24) per tablet Take 1 tablet by mouth Daily.      [DISCONTINUED] famotidine (PEPCID) 40 MG tablet Take 40 mg by mouth Daily.      [DISCONTINUED] levocetirizine (XYZAL) 5 MG tablet Take 5 mg by mouth Daily.      [DISCONTINUED] pantoprazole (PROTONIX) 40 MG EC tablet Take 1 tablet by mouth Daily. 30 tablet 2    [DISCONTINUED] promethazine (PHENERGAN) 25 MG tablet Daily As Needed.       No current facility-administered medications on file prior to visit.       Past Medical History:   Diagnosis Date    GERD (gastroesophageal reflux disease)        Family History   Problem Relation Age of Onset    No Known Problems Mother     No Known Problems Father        Social History     Socioeconomic History    Marital status: Single   Tobacco Use    Smoking status: Never    Smokeless tobacco: Never   Vaping Use    Vaping Use: Never used   Substance and Sexual Activity    Alcohol use: Never    Sexual activity: Defer       Past Surgical History:   Procedure Laterality Date    ENDOSCOPY N/A 09/22/2021    Procedure: ESOPHAGOGASTRODUODENOSCOPY;  Surgeon: Román Monroy MD;  Location: [facility] [place] [facility];  Service: Gastroenterology;  Laterality: N/A;    KNEE ARTHROSCOPY Right     SEPTOPLASTY      2023       The following portions of the patient's  "history were reviewed and updated as appropriate: problem list, allergies, current medications, past medical history, past family history, past social history, and past surgical history.    ROS    See HPI    Immunization History   Administered Date(s) Administered    COVID-19 (LINDSEY) 04/07/2021    DTaP 2001, 2001, 2001, 12/09/2002, 06/03/2005    Flu Vaccine Quad PF 6-35MO 01/27/2017, 10/23/2018    Fluzone >6mos 10/01/2020    H1N1 Nasal 12/21/2009    HPV Quadrivalent 03/24/2015    Hep A, 2 Dose 06/02/2006, 05/29/2007    Hep B, Adolescent or Pediatric 2001, 2001, 05/28/2002    HiB 2001, 2001, 2001, 05/28/2002    Hpv9 03/24/2015, 07/02/2015, 03/29/2016    IPV 2001, 2001, 12/09/2002, 06/03/2005    Influenza LAIV (Nasal) 10/14/2008, 09/25/2009    Influenza Seasonal Injectable 11/19/2005    MMR 09/04/2002, 06/03/2005    Meningococcal B,(Bexsero) 07/23/2018, 07/24/2019    Meningococcal Conjugate 06/08/2012    Meningococcal MCV4P (Menactra) 07/20/2017    Tdap 06/08/2012, 08/10/2022    Varicella 09/04/2002, 05/29/2007       Objective   Vitals:    09/01/23 0911   Temp: 98.2 øF (36.8 øC)   Weight: 82.1 kg (181 lb)   Height: 162.6 cm (64.02\")     Body mass index is 31.05 kg/mý.  Physical Exam  Vitals and nursing note reviewed.   Constitutional:       Appearance: Normal appearance.   HENT:      Head: Normocephalic and atraumatic.      Nose: Nose normal.      Mouth/Throat:      Mouth: Mucous membranes are moist.      Pharynx: Oropharynx is clear.   Eyes:      Extraocular Movements: Extraocular movements intact.      Conjunctiva/sclera: Conjunctivae normal.      Pupils: Pupils are equal, round, and reactive to light.   Neck:      Thyroid: No thyromegaly.   Cardiovascular:      Rate and Rhythm: Normal rate and regular rhythm.      Heart sounds: Normal heart sounds. No murmur heard.  Pulmonary:      Effort: Pulmonary effort is normal.      Breath sounds: Normal breath " sounds.   Abdominal:      Palpations: There is no hepatomegaly or splenomegaly.   Musculoskeletal:      Cervical back: Neck supple.   Lymphadenopathy:      Cervical: No cervical adenopathy.   Skin:     General: Skin is warm and dry.   Neurological:      General: No focal deficit present.      Mental Status: She is alert and oriented to person, place, and time. Mental status is at baseline.      Gait: Gait normal.   Psychiatric:         Mood and Affect: Mood normal.         Behavior: Behavior normal.         Thought Content: Thought content normal.         Judgment: Judgment normal.         Assessment & Plan   Diagnoses and all orders for this visit:    1. Healthcare maintenance (Primary)  -     Cancel: CBC & Differential  -     Cancel: Comprehensive Metabolic Panel  -     Cancel: Lipid Panel With / Chol / HDL Ratio  -     Cancel: TSH  -     QuantiFERON TB Gold  -     CBC & Differential  -     Comprehensive Metabolic Panel  -     Lipid Panel With / Chol / HDL Ratio  -     TSH  -     Ritu 24 Fe 1-20 MG-MCG(24) per tablet; Take 1 tablet by mouth Daily.  Dispense: 84 tablet; Refill: 3     Refilled OCP. Recommended healthy diet, and 150 min of aerobic exercise per week. Vaccinations are utd.     No follow-ups on file.

## 2023-09-05 LAB
ALBUMIN SERPL-MCNC: 4.4 G/DL (ref 4–5)
ALBUMIN/GLOB SERPL: 1.8 {RATIO} (ref 1.2–2.2)
ALP SERPL-CCNC: 103 IU/L (ref 44–121)
ALT SERPL-CCNC: 29 IU/L (ref 0–32)
AST SERPL-CCNC: 24 IU/L (ref 0–40)
BASOPHILS # BLD AUTO: 0.1 X10E3/UL (ref 0–0.2)
BASOPHILS NFR BLD AUTO: 1 %
BILIRUB SERPL-MCNC: 0.3 MG/DL (ref 0–1.2)
BUN SERPL-MCNC: 15 MG/DL (ref 6–20)
BUN/CREAT SERPL: 18 (ref 9–23)
CALCIUM SERPL-MCNC: 9.3 MG/DL (ref 8.7–10.2)
CHLORIDE SERPL-SCNC: 102 MMOL/L (ref 96–106)
CHOLEST SERPL-MCNC: 170 MG/DL (ref 100–199)
CHOLEST/HDLC SERPL: 2.9 RATIO (ref 0–4.4)
CO2 SERPL-SCNC: 21 MMOL/L (ref 20–29)
CREAT SERPL-MCNC: 0.85 MG/DL (ref 0.57–1)
EGFRCR SERPLBLD CKD-EPI 2021: 99 ML/MIN/1.73
EOSINOPHIL # BLD AUTO: 0.1 X10E3/UL (ref 0–0.4)
EOSINOPHIL NFR BLD AUTO: 1 %
ERYTHROCYTE [DISTWIDTH] IN BLOOD BY AUTOMATED COUNT: 12.1 % (ref 11.7–15.4)
GAMMA INTERFERON BACKGROUND BLD IA-ACNC: 0.1 IU/ML
GLOBULIN SER CALC-MCNC: 2.5 G/DL (ref 1.5–4.5)
GLUCOSE SERPL-MCNC: 78 MG/DL (ref 70–99)
HCT VFR BLD AUTO: 44.3 % (ref 34–46.6)
HDLC SERPL-MCNC: 59 MG/DL
HGB BLD-MCNC: 14.9 G/DL (ref 11.1–15.9)
IMM GRANULOCYTES # BLD AUTO: 0 X10E3/UL (ref 0–0.1)
IMM GRANULOCYTES NFR BLD AUTO: 0 %
LDLC SERPL CALC-MCNC: 97 MG/DL (ref 0–99)
LYMPHOCYTES # BLD AUTO: 2 X10E3/UL (ref 0.7–3.1)
LYMPHOCYTES NFR BLD AUTO: 23 %
M TB IFN-G BLD-IMP: NEGATIVE
M TB IFN-G CD4+ T-CELLS BLD-ACNC: 0.1 IU/ML
M TBIFN-G CD4+ CD8+T-CELLS BLD-ACNC: 0.12 IU/ML
MCH RBC QN AUTO: 29.8 PG (ref 26.6–33)
MCHC RBC AUTO-ENTMCNC: 33.6 G/DL (ref 31.5–35.7)
MCV RBC AUTO: 89 FL (ref 79–97)
MITOGEN IGNF BLD-ACNC: >10 IU/ML
MONOCYTES # BLD AUTO: 0.5 X10E3/UL (ref 0.1–0.9)
MONOCYTES NFR BLD AUTO: 7 %
NEUTROPHILS # BLD AUTO: 5.7 X10E3/UL (ref 1.4–7)
NEUTROPHILS NFR BLD AUTO: 68 %
PLATELET # BLD AUTO: 353 X10E3/UL (ref 150–450)
POTASSIUM SERPL-SCNC: 4.5 MMOL/L (ref 3.5–5.2)
PROT SERPL-MCNC: 6.9 G/DL (ref 6–8.5)
QUANTIFERON INCUBATION: NORMAL
RBC # BLD AUTO: 5 X10E6/UL (ref 3.77–5.28)
SERVICE CMNT-IMP: NORMAL
SODIUM SERPL-SCNC: 139 MMOL/L (ref 134–144)
TRIGL SERPL-MCNC: 74 MG/DL (ref 0–149)
TSH SERPL DL<=0.005 MIU/L-ACNC: 2.93 UIU/ML (ref 0.45–4.5)
VLDLC SERPL CALC-MCNC: 14 MG/DL (ref 5–40)
WBC # BLD AUTO: 8.3 X10E3/UL (ref 3.4–10.8)

## 2023-09-18 ENCOUNTER — OFFICE VISIT (OUTPATIENT)
Dept: INTERNAL MEDICINE | Facility: CLINIC | Age: 23
End: 2023-09-18
Payer: COMMERCIAL

## 2023-09-18 VITALS
DIASTOLIC BLOOD PRESSURE: 70 MMHG | TEMPERATURE: 98.4 F | HEART RATE: 80 BPM | SYSTOLIC BLOOD PRESSURE: 112 MMHG | OXYGEN SATURATION: 99 %

## 2023-09-18 DIAGNOSIS — J01.00 ACUTE NON-RECURRENT MAXILLARY SINUSITIS: Primary | ICD-10-CM

## 2023-09-18 PROCEDURE — 99213 OFFICE O/P EST LOW 20 MIN: CPT | Performed by: PHYSICIAN ASSISTANT

## 2023-09-18 RX ORDER — CEFDINIR 300 MG/1
300 CAPSULE ORAL 2 TIMES DAILY
Qty: 14 CAPSULE | Refills: 0 | Status: SHIPPED | OUTPATIENT
Start: 2023-09-18 | End: 2023-09-25 | Stop reason: SDUPTHER

## 2023-09-18 NOTE — PROGRESS NOTES
Subjective   Chief Complaint   Patient presents with    Sore Throat    Cough    Headache     For 2 weeks       History of Present Illness     Pt is here today with cc of sinus pressure, congestion, sore throat and headache x 2 weeks. Has been taking otc meds, not improving. Has thick green nasal drainage. Also has pressure behind her eyes. Has a cough, but non productive. No fever or chills.        Patient Active Problem List   Diagnosis    Right upper quadrant abdominal pain    Nausea and vomiting       No Known Allergies    Current Outpatient Medications on File Prior to Visit   Medication Sig Dispense Refill    cetirizine (zyrTEC) 10 MG tablet Take 1 tablet by mouth Daily.      Ritu 24 Fe 1-20 MG-MCG(24) per tablet Take 1 tablet by mouth Daily. 84 tablet 3     No current facility-administered medications on file prior to visit.       Past Medical History:   Diagnosis Date    GERD (gastroesophageal reflux disease)        Family History   Problem Relation Age of Onset    No Known Problems Mother     No Known Problems Father        Social History     Socioeconomic History    Marital status: Single   Tobacco Use    Smoking status: Never    Smokeless tobacco: Never   Vaping Use    Vaping Use: Never used   Substance and Sexual Activity    Alcohol use: Never    Sexual activity: Defer       Past Surgical History:   Procedure Laterality Date    ENDOSCOPY N/A 09/22/2021    Procedure: ESOPHAGOGASTRODUODENOSCOPY;  Surgeon: Román Monroy MD;  Location: [facility] [place] [facility] OR;  Service: Gastroenterology;  Laterality: N/A;    KNEE ARTHROSCOPY Right     SEPTOPLASTY      2023         The following portions of the patient's history were reviewed and updated as appropriate: problem list, allergies, current medications, past medical history, past family history, past social history, and past surgical history.    ROS    See HPI    Immunization History   Administered Date(s) Administered    COVID-19 (LINDSEY) 04/07/2021    DTaP  2001, 2001, 2001, 12/09/2002, 06/03/2005    Flu Vaccine Quad PF 6-35MO 01/27/2017, 10/23/2018    Fluzone (or Fluarix & Flulaval for VFC) >6mos 10/01/2020    H1N1 Nasal 12/21/2009    HPV Quadrivalent 03/24/2015    Hep A, 2 Dose 06/02/2006, 05/29/2007    Hep B, Adolescent or Pediatric 2001, 2001, 05/28/2002    HiB 2001, 2001, 2001, 05/28/2002    Hpv9 03/24/2015, 07/02/2015, 03/29/2016    IPV 2001, 2001, 12/09/2002, 06/03/2005    Influenza LAIV (Nasal) 10/14/2008, 09/25/2009    Influenza Seasonal Injectable 11/19/2005    MMR 09/04/2002, 06/03/2005    Meningococcal B,(Bexsero) 07/23/2018, 07/24/2019    Meningococcal Conjugate 06/08/2012    Meningococcal MCV4P (Menactra) 07/20/2017    Tdap 06/08/2012, 08/10/2022    Varicella 09/04/2002, 05/29/2007       Objective   Vitals:    09/18/23 0902 09/18/23 0919   BP:  112/70   Pulse: 80    Temp: 98.4 °F (36.9 °C)    SpO2: 99%      There is no height or weight on file to calculate BMI.  Physical Exam  Vitals reviewed.   Constitutional:       Appearance: Normal appearance.   HENT:      Head: Normocephalic and atraumatic.      Right Ear: Ear canal normal.      Left Ear: Ear canal normal.      Nose:      Right Sinus: Maxillary sinus tenderness present.      Left Sinus: Maxillary sinus tenderness present.      Mouth/Throat:      Pharynx: Posterior oropharyngeal erythema present.   Eyes:      Extraocular Movements: Extraocular movements intact.      Conjunctiva/sclera: Conjunctivae normal.      Pupils: Pupils are equal, round, and reactive to light.   Cardiovascular:      Rate and Rhythm: Normal rate and regular rhythm.   Pulmonary:      Effort: Pulmonary effort is normal.      Breath sounds: Normal breath sounds. No wheezing or rales.   Neurological:      Mental Status: She is alert.         Assessment & Plan   Diagnoses and all orders for this visit:    1. Acute non-recurrent maxillary sinusitis (Primary)  -     cefdinir  (OMNICEF) 300 MG capsule; Take 1 capsule by mouth 2 (Two) Times a Day for 7 days.  Dispense: 14 capsule; Refill: 0    Call if not resolved in 1 week and will extend abx for 3 more days.

## 2023-09-25 DIAGNOSIS — J01.00 ACUTE NON-RECURRENT MAXILLARY SINUSITIS: ICD-10-CM

## 2023-09-25 RX ORDER — CEFDINIR 300 MG/1
300 CAPSULE ORAL 2 TIMES DAILY
Qty: 6 CAPSULE | Refills: 0 | Status: SHIPPED | OUTPATIENT
Start: 2023-09-25 | End: 2023-09-28

## 2023-09-25 NOTE — TELEPHONE ENCOUNTER
Caller: Monica Cerna    Relationship: Self    Best call back number: [phone]     Requested Prescriptions:   Requested Prescriptions     Pending Prescriptions Disp Refills    cefdinir (OMNICEF) 300 MG capsule 14 capsule 0     Sig: Take 1 capsule by mouth 2 (Two) Times a Day for 7 days.        Pharmacy where request should be sent: [facility] [id number] - [place], [place] [address] - [phone] [facility] [phone] FX     Last office visit with prescribing clinician: 9/18/2023   Last telemedicine visit with prescribing clinician: Visit date not found   Next office visit with prescribing clinician: Visit date not found     Additional details provided by patient: STATES WAS TOLD IF NOT FEELING BETTER TO CALL AND ASKING FOR A FEW DAYS MORE WROTH OF MEDICATION     Does the patient have less than a 3 day supply:  [x] Yes  [] No    Would you like a call back once the refill request has been completed: [] Yes [x] No    If the office needs to give you a call back, can they leave a voicemail: [] Yes [x] No    Fernando Cottrell Rep   09/25/23 14:25 EDT

## 2023-11-20 ENCOUNTER — OFFICE VISIT (OUTPATIENT)
Dept: INTERNAL MEDICINE | Facility: CLINIC | Age: 23
End: 2023-11-20
Payer: COMMERCIAL

## 2023-11-20 VITALS
SYSTOLIC BLOOD PRESSURE: 112 MMHG | OXYGEN SATURATION: 99 % | HEART RATE: 87 BPM | TEMPERATURE: 98.2 F | DIASTOLIC BLOOD PRESSURE: 74 MMHG

## 2023-11-20 DIAGNOSIS — R09.81 NASAL CONGESTION: Primary | ICD-10-CM

## 2023-11-20 LAB
EXPIRATION DATE: NORMAL
FLUAV AG UPPER RESP QL IA.RAPID: NOT DETECTED
FLUBV AG UPPER RESP QL IA.RAPID: NOT DETECTED
INTERNAL CONTROL: NORMAL
Lab: NORMAL
SARS-COV-2 AG UPPER RESP QL IA.RAPID: NOT DETECTED

## 2023-11-20 PROCEDURE — 87428 SARSCOV & INF VIR A&B AG IA: CPT | Performed by: PHYSICIAN ASSISTANT

## 2023-11-20 PROCEDURE — 99213 OFFICE O/P EST LOW 20 MIN: CPT | Performed by: PHYSICIAN ASSISTANT

## 2023-11-20 RX ORDER — CEFDINIR 300 MG/1
300 CAPSULE ORAL 2 TIMES DAILY
Qty: 14 CAPSULE | Refills: 0 | Status: SHIPPED | OUTPATIENT
Start: 2023-11-20 | End: 2023-11-22

## 2023-11-20 NOTE — PROGRESS NOTES
Subjective   Chief Complaint   Patient presents with    Nasal Congestion    Cough     X1 week    Earache     Pressure since yesterday       History of Present Illness      Pt is here today with respiratory sx x 1 week. Has had a cough and congestion for the last week. Ear pain and sinus pressure started last night. Has colored drainage. No fever or chills.     Patient Active Problem List   Diagnosis    Right upper quadrant abdominal pain    Nausea and vomiting       No Known Allergies    Current Outpatient Medications on File Prior to Visit   Medication Sig Dispense Refill    cetirizine (zyrTEC) 10 MG tablet Take 1 tablet by mouth Daily.      Ritu 24 Fe 1-20 MG-MCG(24) per tablet Take 1 tablet by mouth Daily. 84 tablet 3     No current facility-administered medications on file prior to visit.       Past Medical History:   Diagnosis Date    GERD (gastroesophageal reflux disease)        Family History   Problem Relation Age of Onset    No Known Problems Mother     No Known Problems Father        Social History     Socioeconomic History    Marital status: Single   Tobacco Use    Smoking status: Never    Smokeless tobacco: Never   Vaping Use    Vaping Use: Never used   Substance and Sexual Activity    Alcohol use: Never    Sexual activity: Defer       Past Surgical History:   Procedure Laterality Date    ENDOSCOPY N/A 09/22/2021    Procedure: ESOPHAGOGASTRODUODENOSCOPY;  Surgeon: Román Monroy MD;  [place]: [facility] [place];  Service: Gastroenterology;  Laterality: N/A;    KNEE ARTHROSCOPY Right     SEPTOPLASTY      2023       The following portions of the patient's history were reviewed and updated as appropriate: problem list, allergies, current medications, past medical history, past family history, past social history, and past surgical history.    ROS    See HPI    Immunization History   Administered Date(s) Administered    COVID-19 (LINDSEY) 04/07/2021    DTaP 2001, 2001, 2001, 12/09/2002,  06/03/2005    Flu Vaccine Quad PF 6-35MO 01/27/2017, 10/23/2018    Fluzone (or Fluarix & Flulaval for VFC) >6mos 10/01/2020    H1N1 Nasal 12/21/2009    HPV Quadrivalent 03/24/2015    Hep A, 2 Dose 06/02/2006, 05/29/2007    Hep B, Adolescent or Pediatric 2001, 2001, 05/28/2002    HiB 2001, 2001, 2001, 05/28/2002    Hpv9 03/24/2015, 07/02/2015, 03/29/2016    IPV 2001, 2001, 12/09/2002, 06/03/2005    Influenza LAIV (Nasal) 10/14/2008, 09/25/2009    Influenza Seasonal Injectable 11/19/2005    MMR 09/04/2002, 06/03/2005    Meningococcal B,(Bexsero) 07/23/2018, 07/24/2019    Meningococcal Conjugate 06/08/2012    Meningococcal MCV4P (Menactra) 07/20/2017    Tdap 06/08/2012, 08/10/2022    Varicella 09/04/2002, 05/29/2007       Objective   Vitals:    11/20/23 1252   BP: 112/74   Pulse: 87   Temp: 98.2 °F (36.8 °C)   SpO2: 99%     There is no height or weight on file to calculate BMI.  Physical Exam  Vitals reviewed.   Constitutional:       Appearance: Normal appearance.   HENT:      Head: Normocephalic and atraumatic.      Ears:      Comments: Tms dull bilaterally  Cardiovascular:      Rate and Rhythm: Normal rate and regular rhythm.      Heart sounds: Normal heart sounds.   Pulmonary:      Effort: Pulmonary effort is normal.      Breath sounds: Normal breath sounds.   Lymphadenopathy:      Cervical: No cervical adenopathy.   Neurological:      Mental Status: She is alert.       Assessment & Plan   Diagnoses and all orders for this visit:    1. Nasal congestion (Primary)  -     POCT SARS-CoV-2 Antigen REGINALD + Flu    Other orders  -     cefdinir (OMNICEF) 300 MG capsule; Take 1 capsule by mouth 2 (Two) Times a Day for 7 days.  Dispense: 14 capsule; Refill: 0

## 2023-11-22 ENCOUNTER — TELEPHONE (OUTPATIENT)
Dept: INTERNAL MEDICINE | Facility: CLINIC | Age: 23
End: 2023-11-22

## 2023-11-22 ENCOUNTER — OFFICE VISIT (OUTPATIENT)
Dept: INTERNAL MEDICINE | Facility: CLINIC | Age: 23
End: 2023-11-22
Payer: COMMERCIAL

## 2023-11-22 VITALS
HEART RATE: 58 BPM | SYSTOLIC BLOOD PRESSURE: 120 MMHG | OXYGEN SATURATION: 99 % | WEIGHT: 173.4 LBS | DIASTOLIC BLOOD PRESSURE: 90 MMHG | BODY MASS INDEX: 29.6 KG/M2 | HEIGHT: 64 IN

## 2023-11-22 DIAGNOSIS — L29.9 ITCHING: Primary | ICD-10-CM

## 2023-11-22 RX ORDER — HYDROXYZINE HYDROCHLORIDE 25 MG/1
25 TABLET, FILM COATED ORAL EVERY 6 HOURS PRN
Qty: 28 TABLET | Refills: 0 | Status: SHIPPED | OUTPATIENT
Start: 2023-11-22

## 2023-11-22 RX ORDER — AMOXICILLIN AND CLAVULANATE POTASSIUM 875; 125 MG/1; MG/1
1 TABLET, FILM COATED ORAL 2 TIMES DAILY
Qty: 10 TABLET | Refills: 0 | Status: SHIPPED | OUTPATIENT
Start: 2023-11-22

## 2023-11-22 NOTE — PROGRESS NOTES
"Chief Complaint  Itching    Subjective        Monica Cerna presents to [facility]  History of Present Illness  # Itching  Started after starting cefdinir, worse on her back, tried bathing with baking soda no improvement.  No rash that she has noticed no fevers nausea or vomiting, no other systemic symptoms.  Simply rash diffuse, not noticed any scarring no bleeding, no focal lesions.    Objective   Vital Signs:  /90 (BP Location: Right arm, Patient Position: Sitting, Cuff Size: Adult)   Pulse 58   Ht 162.6 cm (64.02\")   Wt 78.7 kg (173 lb 6.4 oz)   SpO2 99%   BMI 29.75 kg/m²   Estimated body mass index is 29.75 kg/m² as calculated from the following:    Height as of this encounter: 162.6 cm (64.02\").    Weight as of this encounter: 78.7 kg (173 lb 6.4 oz).             Physical Exam  Constitutional:       Appearance: Normal appearance. She is obese.   HENT:      Head: Atraumatic.      Mouth/Throat:      Mouth: Mucous membranes are moist.      Pharynx: Oropharynx is clear.   Eyes:      Extraocular Movements: Extraocular movements intact.   Pulmonary:      Effort: Pulmonary effort is normal.   Skin:     General: Skin is warm and dry.      Capillary Refill: Capillary refill takes less than 2 seconds.      Findings: No bruising, erythema, lesion or rash.   Neurological:      General: No focal deficit present.      Mental Status: She is alert and oriented to person, place, and time.   Psychiatric:         Mood and Affect: Mood normal.         Behavior: Behavior normal.        Result Review :                   Assessment and Plan   Diagnoses and all orders for this visit:    1. Itching (Primary)  -     hydrOXYzine (ATARAX) 25 MG tablet; Take 1 tablet by mouth Every 6 (Six) Hours As Needed for Itching.  Dispense: 28 tablet; Refill: 0    Other orders  -     amoxicillin-clavulanate (AUGMENTIN) 875-125 MG per tablet; Take 1 tablet by mouth 2 (Two) Times a Day.  Dispense: 10 tablet; " Refill: 0    Patient with 2 days of itching when starting cefdinir, likely drug rash versus allergic reaction to the cefdinir itself.  Patient previously taken Augmentin never had an issue within the past, will trial Augmentin for management of her symptoms today.  For itching will prescribe Atarax, patient to take as indicated over the next week.  Patient to return with any worsening symptoms new rashes lesions or bleeding bruising or blistering.  Will add cefdinir as an allergy for this patient.         Follow Up   No follow-ups on file.  Patient was given instructions and counseling regarding her condition or for health maintenance advice. Please see specific information pulled into the AVS if appropriate.

## 2023-11-22 NOTE — TELEPHONE ENCOUNTER
Caller: Monica Cerna    Relationship: Self    Best call back number: [phone]    Which medication are you concerned about: CEFDINIR     Who prescribed you this medication: LAM ODONNELL     When did you start taking this medication: 11/21    What are your concerns: PATIENT STATES THAT YESTERDAY SHE WAS SEEN BY LAM AND WAS GIVEN THIS MEDICATION. PATIENT STATES THAT BEFORE TAKING THIS, SHE HAD A LITTLE BIT OF ITCHINESS, AND NOW SHE HAS IT ALL OVER HER HANDS, LEGS, ARMS, BACK, AND FEET. PATIENT IS NOT HAVING ANY SEVERE ALLERGIC REACTION SYMPTOMS, AND WOULD LIKE TO KNOW WHAT HER NEXT STEPS ARE ASAP. PLEASE ADVISE.

## 2023-12-04 ENCOUNTER — LAB (OUTPATIENT)
Facility: HOSPITAL | Age: 23
End: 2023-12-04
Payer: COMMERCIAL

## 2023-12-04 ENCOUNTER — OFFICE VISIT (OUTPATIENT)
Dept: INTERNAL MEDICINE | Facility: CLINIC | Age: 23
End: 2023-12-04
Payer: COMMERCIAL

## 2023-12-04 VITALS
WEIGHT: 179 LBS | OXYGEN SATURATION: 99 % | TEMPERATURE: 98.8 F | HEIGHT: 64 IN | BODY MASS INDEX: 30.56 KG/M2 | HEART RATE: 72 BPM

## 2023-12-04 DIAGNOSIS — J02.9 SORE THROAT: Primary | ICD-10-CM

## 2023-12-04 LAB
EXPIRATION DATE: NORMAL
INTERNAL CONTROL: NORMAL
Lab: NORMAL
S PYO AG THROAT QL: NEGATIVE

## 2023-12-04 PROCEDURE — 99213 OFFICE O/P EST LOW 20 MIN: CPT | Performed by: PHYSICIAN ASSISTANT

## 2023-12-04 PROCEDURE — 87880 STREP A ASSAY W/OPTIC: CPT | Performed by: PHYSICIAN ASSISTANT

## 2023-12-04 PROCEDURE — 0202U NFCT DS 22 TRGT SARS-COV-2: CPT | Performed by: PHYSICIAN ASSISTANT

## 2023-12-04 NOTE — PROGRESS NOTES
Subjective   Chief Complaint   Patient presents with    Headache    Sore Throat     Started back up over the weekend       History of Present Illness     Sore troat and also an intermittent headache for the last few days. Started after stopping and finishing the Augmentin. Felt like she got 100 % better.  No cough, no fever or chills. Working at a  currently.      Patient Active Problem List   Diagnosis    Right upper quadrant abdominal pain    Nausea and vomiting       Allergies   Allergen Reactions    Cefdinir Itching       Current Outpatient Medications on File Prior to Visit   Medication Sig Dispense Refill    cetirizine (zyrTEC) 10 MG tablet Take 1 tablet by mouth Daily.      Ritu 24 Fe 1-20 MG-MCG(24) per tablet Take 1 tablet by mouth Daily. 84 tablet 3    [DISCONTINUED] amoxicillin-clavulanate (AUGMENTIN) 875-125 MG per tablet Take 1 tablet by mouth 2 (Two) Times a Day. (Patient not taking: Reported on 12/4/2023) 10 tablet 0    [DISCONTINUED] hydrOXYzine (ATARAX) 25 MG tablet Take 1 tablet by mouth Every 6 (Six) Hours As Needed for Itching. (Patient not taking: Reported on 12/4/2023) 28 tablet 0     No current facility-administered medications on file prior to visit.       Past Medical History:   Diagnosis Date    GERD (gastroesophageal reflux disease)        Family History   Problem Relation Age of Onset    No Known Problems Mother     No Known Problems Father        Social History     Socioeconomic History    Marital status: Single   Tobacco Use    Smoking status: Never    Smokeless tobacco: Never   Vaping Use    Vaping Use: Never used   Substance and Sexual Activity    Alcohol use: Never    Sexual activity: Defer       Past Surgical History:   Procedure Laterality Date    ENDOSCOPY N/A 09/22/2021    Procedure: ESOPHAGOGASTRODUODENOSCOPY;  Surgeon: Román Monroy MD;  Location: [facility] [place] MAIN OR;  Service: Gastroenterology;  Laterality: N/A;    KNEE ARTHROSCOPY Right     SEPTOPLASTY      2023  "        The following portions of the patient's history were reviewed and updated as appropriate: problem list, allergies, current medications, past medical history, past family history, past social history, and past surgical history.    ROS    See HPI    Immunization History   Administered Date(s) Administered    COVID-19 (LINDSEY) 04/07/2021    DTaP 2001, 2001, 2001, 12/09/2002, 06/03/2005    Flu Vaccine Quad PF 6-35MO 01/27/2017, 10/23/2018    Fluzone (or Fluarix & Flulaval for VFC) >6mos 10/01/2020    H1N1 Nasal 12/21/2009    HPV Quadrivalent 03/24/2015    Hep A, 2 Dose 06/02/2006, 05/29/2007    Hep B, Adolescent or Pediatric 2001, 2001, 05/28/2002    HiB 2001, 2001, 2001, 05/28/2002    Hpv9 03/24/2015, 07/02/2015, 03/29/2016    IPV 2001, 2001, 12/09/2002, 06/03/2005    Influenza LAIV (Nasal) 10/14/2008, 09/25/2009    Influenza Seasonal Injectable 11/19/2005    MMR 09/04/2002, 06/03/2005    Meningococcal B,(Bexsero) 07/23/2018, 07/24/2019    Meningococcal Conjugate 06/08/2012    Meningococcal MCV4P (Menactra) 07/20/2017    Tdap 06/08/2012, 08/10/2022    Varicella 09/04/2002, 05/29/2007       Objective   Vitals:    12/04/23 1515   Pulse: 72   Temp: 98.8 °F (37.1 °C)   SpO2: 99%   Weight: 81.2 kg (179 lb)   Height: 162.6 cm (64.02\")     Body mass index is 30.71 kg/m².  Physical Exam  Vitals reviewed.   Constitutional:       Appearance: Normal appearance.   HENT:      Mouth/Throat:      Tonsils: No tonsillar exudate. 1+ on the right. 1+ on the left.   Cardiovascular:      Rate and Rhythm: Normal rate and regular rhythm.   Pulmonary:      Effort: Pulmonary effort is normal.      Breath sounds: Normal breath sounds. No wheezing.   Lymphadenopathy:      Cervical: No cervical adenopathy.   Neurological:      Mental Status: She is alert.           Assessment & Plan   Diagnoses and all orders for this visit:    1. Sore throat (Primary)  -     Respiratory Panel " PCR w/COVID-19(SARS-CoV-2) [facility]/DEE/[facility] In-House, NP Swab in UTM/VTM, 2 HR TAT - Swab, Nasopharynx  -     POCT rapid strep A  -     Throat / Upper Respiratory Culture - Swab, Throat     Rapid strep negative, send out throat culture. Will check respiratory panel as well.     No follow-ups on file.

## 2023-12-05 LAB
B PARAP IS1001 DNA NPH QL NAA+NON-PROBE: NORMAL
B PARAPERT DNA SPEC QL NAA+PROBE: NOT DETECTED
B PERT DNA SPEC QL NAA+PROBE: NOT DETECTED
B PERT.PT PRMT NPH QL NAA+NON-PROBE: NORMAL
C PNEUM DNA NPH QL NAA+NON-PROBE: NORMAL
C PNEUM DNA NPH QL NAA+NON-PROBE: NOT DETECTED
FLUAV H1 2009 PAN RNA NPH NAA+NON-PROBE: NORMAL
FLUAV H1 RNA NPH QL NAA+NON-PROBE: NORMAL
FLUAV H3 RNA NPH QL NAA+NON-PROBE: NORMAL
FLUAV RNA NPH QL NAA+NON-PROBE: NORMAL
FLUAV SUBTYP SPEC NAA+PROBE: NOT DETECTED
FLUBV RNA ISLT QL NAA+PROBE: NOT DETECTED
FLUBV RNA NPH QL NAA+NON-PROBE: NORMAL
HADV DNA NPH QL NAA+NON-PROBE: NORMAL
HADV DNA SPEC NAA+PROBE: NOT DETECTED
HCOV 229E RNA NPH QL NAA+NON-PROBE: NORMAL
HCOV 229E RNA SPEC QL NAA+PROBE: NOT DETECTED
HCOV HKU1 RNA NPH QL NAA+NON-PROBE: NORMAL
HCOV HKU1 RNA SPEC QL NAA+PROBE: NOT DETECTED
HCOV NL63 RNA NPH QL NAA+NON-PROBE: NORMAL
HCOV NL63 RNA SPEC QL NAA+PROBE: NOT DETECTED
HCOV OC43 RNA NPH QL NAA+NON-PROBE: NORMAL
HCOV OC43 RNA SPEC QL NAA+PROBE: NOT DETECTED
HMPV RNA NPH QL NAA+NON-PROBE: NORMAL
HMPV RNA NPH QL NAA+NON-PROBE: NOT DETECTED
HPIV1 RNA ISLT QL NAA+PROBE: NOT DETECTED
HPIV1 RNA NPH QL NAA+NON-PROBE: NORMAL
HPIV2 RNA NPH QL NAA+NON-PROBE: NORMAL
HPIV2 RNA SPEC QL NAA+PROBE: NOT DETECTED
HPIV3 RNA NPH QL NAA+NON-PROBE: NORMAL
HPIV3 RNA NPH QL NAA+PROBE: NOT DETECTED
HPIV4 P GENE NPH QL NAA+PROBE: NOT DETECTED
HPIV4 RNA NPH QL NAA+NON-PROBE: NORMAL
M PNEUMO DNA NPH QL NAA+NON-PROBE: NORMAL
M PNEUMO IGG SER IA-ACNC: NOT DETECTED
RHINOVIRUS RNA SPEC NAA+PROBE: NOT DETECTED
RSV RNA NPH QL NAA+NON-PROBE: NORMAL
RSV RNA NPH QL NAA+NON-PROBE: NOT DETECTED
RV+EV RNA NPH QL NAA+NON-PROBE: NORMAL
SARS-COV-2 RNA NPH QL NAA+NON-PROBE: NORMAL
SARS-COV-2 RNA NPH QL NAA+NON-PROBE: NOT DETECTED
SPECIMEN STATUS: NORMAL

## 2023-12-07 LAB
BACTERIA SPEC RESP CULT: NORMAL
BACTERIA SPEC RESP CULT: NORMAL

## 2023-12-08 RX ORDER — AZITHROMYCIN 250 MG/1
TABLET, FILM COATED ORAL
Qty: 6 TABLET | Refills: 0 | Status: SHIPPED | OUTPATIENT
Start: 2023-12-08

## 2023-12-19 DIAGNOSIS — L50.9 URTICARIA: Primary | ICD-10-CM

## 2023-12-19 RX ORDER — HYDROXYZINE HYDROCHLORIDE 25 MG/1
25 TABLET, FILM COATED ORAL 3 TIMES DAILY PRN
Qty: 60 TABLET | Refills: 0 | Status: SHIPPED | OUTPATIENT
Start: 2023-12-19

## 2024-03-18 ENCOUNTER — HOSPITAL ENCOUNTER (OUTPATIENT)
Facility: HOSPITAL | Age: 24
Discharge: HOME | End: 2024-03-18
Payer: COMMERCIAL

## 2024-03-18 ENCOUNTER — OFFICE VISIT (OUTPATIENT)
Dept: INTERNAL MEDICINE | Facility: CLINIC | Age: 24
End: 2024-03-18
Payer: COMMERCIAL

## 2024-03-18 ENCOUNTER — LAB (OUTPATIENT)
Facility: HOSPITAL | Age: 24
End: 2024-03-18
Payer: COMMERCIAL

## 2024-03-18 VITALS — WEIGHT: 174 LBS | BODY MASS INDEX: 29.71 KG/M2 | HEIGHT: 64 IN | TEMPERATURE: 98 F

## 2024-03-18 DIAGNOSIS — R10.9 LEFT FLANK PAIN: Primary | ICD-10-CM

## 2024-03-18 LAB
ALBUMIN SERPL-MCNC: 3.8 G/DL (ref 3.5–5.2)
ALBUMIN/GLOB SERPL: 1.5 G/DL
ALP SERPL-CCNC: 91 U/L (ref 39–117)
ALT SERPL W P-5'-P-CCNC: 16 U/L (ref 1–33)
ANION GAP SERPL CALCULATED.3IONS-SCNC: 13.3 MMOL/L (ref 5–15)
AST SERPL-CCNC: 5 U/L (ref 1–32)
BASOPHILS # BLD AUTO: 0.05 10*3/MM3 (ref 0–0.2)
BASOPHILS NFR BLD AUTO: 0.6 % (ref 0–1.5)
BILIRUB SERPL-MCNC: 0.2 MG/DL (ref 0–1.2)
BILIRUB UR QL STRIP: NEGATIVE
BUN SERPL-MCNC: 11 MG/DL (ref 6–20)
BUN/CREAT SERPL: 14.3 (ref 7–25)
CALCIUM SPEC-SCNC: 8.5 MG/DL (ref 8.6–10.5)
CHLORIDE SERPL-SCNC: 106 MMOL/L (ref 98–107)
CLARITY UR: CLEAR
CO2 SERPL-SCNC: 20.7 MMOL/L (ref 22–29)
COLOR UR: YELLOW
CREAT SERPL-MCNC: 0.77 MG/DL (ref 0.57–1)
DEPRECATED RDW RBC AUTO: 38.6 FL (ref 37–54)
EGFRCR SERPLBLD CKD-EPI 2021: 112 ML/MIN/1.73
EOSINOPHIL # BLD AUTO: 0.09 10*3/MM3 (ref 0–0.4)
EOSINOPHIL NFR BLD AUTO: 1 % (ref 0.3–6.2)
ERYTHROCYTE [DISTWIDTH] IN BLOOD BY AUTOMATED COUNT: 12 % (ref 12.3–15.4)
GLOBULIN UR ELPH-MCNC: 2.6 GM/DL
GLUCOSE SERPL-MCNC: 71 MG/DL (ref 65–99)
GLUCOSE UR STRIP-MCNC: NEGATIVE MG/DL
HCT VFR BLD AUTO: 38.7 % (ref 34–46.6)
HGB BLD-MCNC: 13.1 G/DL (ref 12–15.9)
HGB UR QL STRIP.AUTO: NEGATIVE
HOLD SPECIMEN: NORMAL
HOLD SPECIMEN: NORMAL
IMM GRANULOCYTES # BLD AUTO: 0.01 10*3/MM3 (ref 0–0.05)
IMM GRANULOCYTES NFR BLD AUTO: 0.1 % (ref 0–0.5)
KETONES UR QL STRIP: NEGATIVE
LEUKOCYTE ESTERASE UR QL STRIP.AUTO: NEGATIVE
LYMPHOCYTES # BLD AUTO: 2.5 10*3/MM3 (ref 0.7–3.1)
LYMPHOCYTES NFR BLD AUTO: 27.9 % (ref 19.6–45.3)
MCH RBC QN AUTO: 30.1 PG (ref 26.6–33)
MCHC RBC AUTO-ENTMCNC: 33.9 G/DL (ref 31.5–35.7)
MCV RBC AUTO: 89 FL (ref 79–97)
MONOCYTES # BLD AUTO: 0.52 10*3/MM3 (ref 0.1–0.9)
MONOCYTES NFR BLD AUTO: 5.8 % (ref 5–12)
NEUTROPHILS NFR BLD AUTO: 5.8 10*3/MM3 (ref 1.7–7)
NEUTROPHILS NFR BLD AUTO: 64.6 % (ref 42.7–76)
NITRITE UR QL STRIP: NEGATIVE
NRBC BLD AUTO-RTO: 0 /100 WBC (ref 0–0.2)
PH UR STRIP.AUTO: 6.5 [PH] (ref 5–8)
PLATELET # BLD AUTO: 337 10*3/MM3 (ref 140–450)
PMV BLD AUTO: 9.5 FL (ref 6–12)
POTASSIUM SERPL-SCNC: 4.1 MMOL/L (ref 3.5–5.2)
PROT SERPL-MCNC: 6.4 G/DL (ref 6–8.5)
PROT UR QL STRIP: NEGATIVE
RBC # BLD AUTO: 4.35 10*6/MM3 (ref 3.77–5.28)
SODIUM SERPL-SCNC: 140 MMOL/L (ref 136–145)
SP GR UR STRIP: 1.01 (ref 1–1.03)
UROBILINOGEN UR QL STRIP: NORMAL
WBC NRBC COR # BLD AUTO: 8.97 10*3/MM3 (ref 3.4–10.8)

## 2024-03-18 PROCEDURE — 81003 URINALYSIS AUTO W/O SCOPE: CPT | Performed by: PHYSICIAN ASSISTANT

## 2024-03-18 PROCEDURE — 80053 COMPREHEN METABOLIC PANEL: CPT | Performed by: PHYSICIAN ASSISTANT

## 2024-03-18 PROCEDURE — 99213 OFFICE O/P EST LOW 20 MIN: CPT | Performed by: PHYSICIAN ASSISTANT

## 2024-03-18 PROCEDURE — 85025 COMPLETE CBC W/AUTO DIFF WBC: CPT | Performed by: PHYSICIAN ASSISTANT

## 2024-03-18 PROCEDURE — 74018 RADEX ABDOMEN 1 VIEW: CPT

## 2024-03-18 PROCEDURE — 36415 COLL VENOUS BLD VENIPUNCTURE: CPT | Performed by: PHYSICIAN ASSISTANT

## 2024-03-18 RX ORDER — CYCLOBENZAPRINE HCL 10 MG
10 TABLET ORAL 3 TIMES DAILY PRN
Qty: 30 TABLET | Refills: 0 | Status: SHIPPED | OUTPATIENT
Start: 2024-03-18

## 2024-03-18 NOTE — PROGRESS NOTES
Subjective   Chief Complaint   Patient presents with    Abdominal Pain     Left side into lower back. Seen GYN last week negative for UTI and ovarian cyst. Started last Wednesday         History of Present Illness     Started having low back pain 6 days ago in the middle of her run. Has had some nausea as well. Has had chills, no fever. Saw GYN because pain originally started in her pelvis on the left side. She states she had a pelvic us and u/a that were normal. She has seen no blood her urine. No dysuria or frequency. No changes in bowels. Ibuprofen and tylenol are not helping with the pain. Heat sometimes helps. No known injury or new workout.      Patient Active Problem List   Diagnosis    Right upper quadrant abdominal pain    Nausea and vomiting       Allergies   Allergen Reactions    Cefdinir Itching       Current Outpatient Medications on File Prior to Visit   Medication Sig Dispense Refill    cetirizine (zyrTEC) 10 MG tablet Take 1 tablet by mouth Daily.      Ritu 24 Fe 1-20 MG-MCG(24) per tablet Take 1 tablet by mouth Daily. 84 tablet 3    [DISCONTINUED] azithromycin (Zithromax) 250 MG tablet Take 2 tablets the first day, then 1 tablet daily for 4 days. 6 tablet 0    [DISCONTINUED] hydrOXYzine (ATARAX) 25 MG tablet Take 1 tablet by mouth 3 (Three) Times a Day As Needed for Itching. 60 tablet 0     No current facility-administered medications on file prior to visit.       Past Medical History:   Diagnosis Date    GERD (gastroesophageal reflux disease)        Family History   Problem Relation Age of Onset    No Known Problems Mother     No Known Problems Father        Social History     Socioeconomic History    Marital status: Single   Tobacco Use    Smoking status: Never    Smokeless tobacco: Never   Vaping Use    Vaping status: Never Used   Substance and Sexual Activity    Alcohol use: Never    Sexual activity: Defer       Past Surgical History:   Procedure Laterality Date    ENDOSCOPY N/A 09/22/2021     "Procedure: ESOPHAGOGASTRODUODENOSCOPY;  Surgeon: Román Monroy MD;  [place] [facility] [place]  Service: Gastroenterology;  Laterality: N/A;    KNEE ARTHROSCOPY Right     SEPTOPLASTY      2023         The following portions of the patient's history were reviewed and updated as appropriate: problem list, allergies, current medications, past medical history, past family history, past social history, and past surgical history.    ROS    See HPI    Immunization History   Administered Date(s) Administered    COVID-19 (LINDSEY) 04/07/2021    DTaP 2001, 2001, 2001, 12/09/2002, 06/03/2005    Flu Vaccine Quad PF 6-35MO 01/27/2017, 10/23/2018    Fluzone (or Fluarix & Flulaval for VFC) >6mos 10/01/2020    H1N1 Nasal 12/21/2009    HPV Quadrivalent 03/24/2015    Hep A, 2 Dose 06/02/2006, 05/29/2007    Hep B, Adolescent or Pediatric 2001, 2001, 05/28/2002    HiB 2001, 2001, 2001, 05/28/2002    Hpv9 03/24/2015, 07/02/2015, 03/29/2016    IPV 2001, 2001, 12/09/2002, 06/03/2005    Influenza LAIV (Nasal) 10/14/2008, 09/25/2009    Influenza Seasonal Injectable 11/19/2005    MMR 09/04/2002, 06/03/2005    Meningococcal B,(Bexsero) 07/23/2018, 07/24/2019    Meningococcal Conjugate 06/08/2012    Meningococcal MCV4P (Menactra) 07/20/2017    Tdap 06/08/2012, 08/10/2022    Varicella 09/04/2002, 05/29/2007       Objective   Vitals:    03/18/24 1324   Temp: 98 °F (36.7 °C)   Weight: 78.9 kg (174 lb)   Height: 162.6 cm (64.02\")     Body mass index is 29.85 kg/m².  Physical Exam  Vitals reviewed.   Constitutional:       Appearance: Normal appearance.   HENT:      Head: Normocephalic and atraumatic.   Abdominal:      Tenderness: There is left CVA tenderness.   Neurological:      Mental Status: She is alert.           Assessment & Plan   Diagnoses and all orders for this visit:    1. Left flank pain (Primary)  -     Cancel: CBC & Differential  -     Cancel: Comprehensive Metabolic " Panel  -     Urinalysis With Culture If Indicated - Urine, Clean Catch  -     cyclobenzaprine (FLEXERIL) 10 MG tablet; Take 1 tablet by mouth 3 (Three) Times a Day As Needed for Muscle Spasms.  Dispense: 30 tablet; Refill: 0  -     XR Abdomen KUB  -     Comprehensive Metabolic Panel  -     CBC & Differential  -     [place] Urine Culture Tube - Urine, Clean Catch       [facility]. She is tender on exam. Repeat urine today, check CBC, CMP and KUB also for possible stone. Sent in muscle relaxer to use as well.     No follow-ups on file.

## 2024-03-22 DIAGNOSIS — R10.9 LEFT FLANK PAIN: Primary | ICD-10-CM

## 2024-04-04 ENCOUNTER — TREATMENT (OUTPATIENT)
Dept: PHYSICAL THERAPY | Facility: CLINIC | Age: 24
End: 2024-04-04
Payer: COMMERCIAL

## 2024-04-04 DIAGNOSIS — R10.9 LEFT FLANK PAIN: Primary | ICD-10-CM

## 2024-04-04 PROCEDURE — 97161 PT EVAL LOW COMPLEX 20 MIN: CPT | Performed by: PHYSICAL THERAPIST

## 2024-04-04 PROCEDURE — 97110 THERAPEUTIC EXERCISES: CPT | Performed by: PHYSICAL THERAPIST

## 2024-04-04 NOTE — PROGRESS NOTES
Physical Therapy Initial Evaluation and Plan of Care    Patient: Monica Cerna   : 2000  Diagnosis/ICD-10 Code:  Left flank pain [R10.9]  Referring practitioner: Eliana Cagle PA-C  Date of Initial Visit: 2024  Today's Date: 2024  Patient seen for 1 sessions         Visit Diagnoses:    ICD-10-CM ICD-9-CM   1. Left flank pain  R10.9 789.09       Subjective Questionnaire: Oswestry: 24    Subjective Evaluation    History of Present Illness  Mechanism of injury: 23 year old female with reports of L flank pain over the past 3 weeks. States her pain started while running. Pain is dull and aching in nature. States her pain is fairly constant and does not change much with activity. Believes it may be worse in the evening if she did more activity that day.     No N/T. Denies bowel changes. Has reported increased frequency urination. Has had u/a performed as well as blood work which were all normal. KUB also normal.       Patient Occupation:  Pain  At worst pain ratin  Location: L flank  Quality: dull ache  Relieving factors: medications and heat  Exacerbated by: sitting, driving.    Diagnostic Tests  No diagnostic tests performed    Treatments  No previous or current treatments  Patient Goals  Patient goals for therapy: decreased pain and return to sport/leisure activities             Objective          Postural Observations  Seated posture: fair  Standing posture: fair      Neurological Testing     Sensation     Lumbar   Left   Intact: light touch    Right   Intact: light touch    Active Range of Motion     Lumbar   Normal active range of motion    Strength/Myotome Testing     Lumbar   Left   Normal strength    Right   Normal strength    Tests     Lumbar     Left   Negative passive SLR and quadrant.     Right   Negative passive SLR and quadrant.     Lumbar Flexibility Comments:   Moderate HS tightness B, moderate L paraspinal tightness           Assessment & Plan        Assessment  Impairments: lacks appropriate home exercise program and pain with function   Functional limitations: sitting   Assessment details: 23 year old female with reports of L flank pain over the past 3 weeks.  Pt presents to skilled PT with L flank pain, postural abnormality, and paraspinal and LE flexibility restrictions. AROM of lumbar spine and strength are WFL. Pt would benefit from skilled PT to address the above findings and improve pt's ease with functional mobility and return to PLOF.      Barriers to therapy: none  Prognosis: good    Goals  Plan Goals: ST. Pt will demo good compliance and tolerance with HEP in 2 weeks.  2. Pt will report reduction in worst pain level to 2/10 in L flank in 2 weeks.    LT. Pt will demo independence with HEP in 4 weeks to prevent re-occurrence.   2. Pt will demo mild LE and trunk flexibility restrictions in 4 weeks to prevent re-occurrence.   3. Pt will improve modified Oswestry score to 10% in 4 weeks for return to PLOF.      Plan  Therapy options: will be seen for skilled therapy services  Planned modality interventions: electrical stimulation/Russian stimulation, cryotherapy and thermotherapy (hydrocollator packs)  Planned therapy interventions: manual therapy, neuromuscular re-education, postural training, soft tissue mobilization, spinal/joint mobilization, strengthening, stretching, therapeutic activities, joint mobilization, home exercise program and flexibility  Frequency: 1x week  Duration in weeks: 4  Treatment plan discussed with: patient        History # of Personal Factors and/or Comorbidities: MODERATE (1-2)  Examination of Body System(s): # of elements: LOW (1-2)  Clinical Presentation: STABLE   Clinical Decision Making: LOW       Timed:  Therapeutic Exercise:    15     mins  60840;       Untimed:  Low Eval                        15     Mins  68892    Timed Treatment:   15   mins   Total Treatment:     30   mins    PT SIGNATURE: Kylee TOM  MICAH Dooley, DPT  PT license: [place] [id number]        DATE TREATMENT INITIATED: 4/4/2024    Initial Certification  Certification Period: 7/2/2024  I certify that the therapy services are furnished while this patient is under my care.  The services outlined above are required by this patient, and will be reviewed every 90 days.    Physician Signature: __________________________________________________________    PHYSICIAN: Eliana Cagle PA-C      DATE:     Please sign and return via fax to [phone].. Thank you, [facility].  [address] [place] [address]

## 2024-04-10 ENCOUNTER — TREATMENT (OUTPATIENT)
Dept: PHYSICAL THERAPY | Facility: CLINIC | Age: 24
End: 2024-04-10
Payer: COMMERCIAL

## 2024-04-10 DIAGNOSIS — R10.9 LEFT FLANK PAIN: Primary | ICD-10-CM

## 2024-04-10 PROCEDURE — 97014 ELECTRIC STIMULATION THERAPY: CPT | Performed by: PHYSICAL THERAPIST

## 2024-04-10 PROCEDURE — 97110 THERAPEUTIC EXERCISES: CPT | Performed by: PHYSICAL THERAPIST

## 2024-04-10 NOTE — PROGRESS NOTES
Physical Therapy Daily Treatment Note  Visit: 2        Patient: Monica Cerna   : 2000  Diagnosis/ICD-10 Code:  Left flank pain [R10.9]  Referring practitioner: Eliana Cagle PA-C  Date of Initial Visit: Type: THERAPY  Noted: 2024  Today's Date: 4/10/2024  Patient seen for 2 sessions       Visit Diagnoses:    ICD-10-CM ICD-9-CM   1. Left flank pain  R10.9 789.09       Subjective     Monica Cerna reports: increase in L sided low back pain Monday and Tuesday with stretching.    Objective   See Exercise, Manual, and Modality Logs for complete treatment.       Assessment/Plan  Modalities performed today for pain relief. Advised on heat prior to stretches and more gentle stretching. Pt verbalized understanding.     Progress per Plan of Care.       Timed:    Therapeutic Exercise:   10  mins  09702;         Untimed:  Electrical Stimulation:    15     mins  56945 ( );      Timed Treatment:   10   mins   Total Treatment:     25   mins        Kylee Dooley PT, DPT  Physical Therapist  PT license: [place] [id number]

## 2024-04-17 ENCOUNTER — TREATMENT (OUTPATIENT)
Dept: PHYSICAL THERAPY | Facility: CLINIC | Age: 24
End: 2024-04-17
Payer: COMMERCIAL

## 2024-04-17 DIAGNOSIS — R10.9 LEFT FLANK PAIN: Primary | ICD-10-CM

## 2024-04-17 PROCEDURE — 97140 MANUAL THERAPY 1/> REGIONS: CPT | Performed by: PHYSICAL THERAPIST

## 2024-04-17 PROCEDURE — 97014 ELECTRIC STIMULATION THERAPY: CPT | Performed by: PHYSICAL THERAPIST

## 2024-04-17 PROCEDURE — 97110 THERAPEUTIC EXERCISES: CPT | Performed by: PHYSICAL THERAPIST

## 2024-04-17 NOTE — PROGRESS NOTES
Physical Therapy Daily Treatment Note  Visit: 3        Patient: Monica Cerna   : 2000  Diagnosis/ICD-10 Code:  Left flank pain [R10.9]  Referring practitioner: Eliana Cagle PA-C  Date of Initial Visit: Type: THERAPY  Noted: 2024  Today's Date: 2024  Patient seen for 3 sessions       Visit Diagnoses:    ICD-10-CM ICD-9-CM   1. Left flank pain  R10.9 789.09       Subjective     Monica Cerna reports: slight improvement in low back pain intensity, but feels she flared up her symptoms on Monday.     Objective   See Exercise, Manual, and Modality Logs for complete treatment.       Assessment/Plan  Utilized manual intervention today for STM and joint mobilizations to lumbar spine with good tolerance. Rotational stretch added to HEP.     Progress per Plan of Care.         Timed:  Manual Therapy:    15     mins  51940;  Therapeutic Exercise:    8     mins  06210;       Untimed:  Electrical Stimulation:    15     mins  08040 ( );    Timed Treatment:   23   mins   Total Treatment:     38   mins        Kylee Dooley PT, DPT  Physical Therapist  PT license: [place] [id number]

## 2024-04-23 ENCOUNTER — TREATMENT (OUTPATIENT)
Dept: PHYSICAL THERAPY | Facility: CLINIC | Age: 24
End: 2024-04-23
Payer: COMMERCIAL

## 2024-04-23 DIAGNOSIS — R10.9 LEFT FLANK PAIN: Primary | ICD-10-CM

## 2024-04-23 PROCEDURE — 97014 ELECTRIC STIMULATION THERAPY: CPT | Performed by: PHYSICAL THERAPIST

## 2024-04-23 PROCEDURE — 97140 MANUAL THERAPY 1/> REGIONS: CPT | Performed by: PHYSICAL THERAPIST

## 2024-04-23 PROCEDURE — 97530 THERAPEUTIC ACTIVITIES: CPT | Performed by: PHYSICAL THERAPIST

## 2024-04-23 PROCEDURE — 97110 THERAPEUTIC EXERCISES: CPT | Performed by: PHYSICAL THERAPIST

## 2024-04-23 NOTE — PROGRESS NOTES
Physical Therapy Daily Treatment Note  Visit: 4        Patient: Monica Cerna   : 2000  Diagnosis/ICD-10 Code:  Left flank pain [R10.9]  Referring practitioner: Eliana Cagle PA-C  Date of Initial Visit: Type: THERAPY  Noted: 2024  Today's Date: 2024  Patient seen for 4 sessions       Visit Diagnoses:    ICD-10-CM ICD-9-CM   1. Left flank pain  R10.9 789.09       Subjective     Monica Cerna reports: L flank pain seems a little better, but has noticed it is worst in the morning.    Objective   See Exercise, Manual, and Modality Logs for complete treatment.       Assessment/Plan  Additional thoracic spine stretches issued today with good carry over. Cont pain stretching and joint mobilizations with good tolerance.     Progress per Plan of Care.         Timed:  Manual Therapy:    15     mins  57954;  Therapeutic Exercise:    8     mins  21910;     Therapeutic Activity:     8     mins  24397;       Untimed:  Electrical Stimulation:    15     mins  78441 ( );    Timed Treatment:   31   mins   Total Treatment:     46   mins        Kylee Dooley PT, DPT  Physical Therapist  PT license: [place] [id number]

## 2024-05-01 ENCOUNTER — TREATMENT (OUTPATIENT)
Dept: PHYSICAL THERAPY | Facility: CLINIC | Age: 24
End: 2024-05-01
Payer: COMMERCIAL

## 2024-05-01 DIAGNOSIS — R10.9 LEFT FLANK PAIN: Primary | ICD-10-CM

## 2024-05-01 NOTE — PROGRESS NOTES
Physical Therapy Daily Treatment Note  Visit: 5        Patient: Monica Cerna   : 2000  Diagnosis/ICD-10 Code:  Left flank pain [R10.9]  Referring practitioner: Eliana Cagle PA-C  Date of Initial Visit: Type: THERAPY  Noted: 2024  Today's Date: 2024  Patient seen for 5 sessions       Visit Diagnoses:    ICD-10-CM ICD-9-CM   1. Left flank pain  R10.9 789.09       Subjective     Monica Cerna reports: pain is improving in L flank, however had to run at work today and is hurting.     Objective   See Exercise, Manual, and Modality Logs for complete treatment.       Assessment/Plan  STM and passive stretching performed with good response. Cont to advise on heat/ TENS at home as well as gentle stretching for management.     Progress per Plan of Care.         Timed:  Manual Therapy:    15     mins  04459;  Therapeutic Exercise:    8     mins  05437;     Therapeutic Activity:     8     mins  47674;         Timed Treatment:   31   mins   Total Treatment:     31   mins        Kylee Dooley PT, DPT  Physical Therapist  PT license: [place] [id number]

## 2024-05-08 ENCOUNTER — TREATMENT (OUTPATIENT)
Dept: PHYSICAL THERAPY | Facility: CLINIC | Age: 24
End: 2024-05-08
Payer: COMMERCIAL

## 2024-05-08 DIAGNOSIS — R10.9 LEFT FLANK PAIN: Primary | ICD-10-CM

## 2024-05-17 ENCOUNTER — OFFICE VISIT (OUTPATIENT)
Dept: INTERNAL MEDICINE | Facility: CLINIC | Age: 24
End: 2024-05-17
Payer: COMMERCIAL

## 2024-05-17 VITALS
OXYGEN SATURATION: 98 % | DIASTOLIC BLOOD PRESSURE: 70 MMHG | SYSTOLIC BLOOD PRESSURE: 110 MMHG | TEMPERATURE: 98.8 F | HEART RATE: 82 BPM

## 2024-05-17 DIAGNOSIS — J06.9 UPPER RESPIRATORY TRACT INFECTION, UNSPECIFIED TYPE: Primary | ICD-10-CM

## 2024-05-17 DIAGNOSIS — M54.6 CHRONIC LEFT-SIDED THORACIC BACK PAIN: ICD-10-CM

## 2024-05-17 DIAGNOSIS — R10.9 LEFT FLANK PAIN: ICD-10-CM

## 2024-05-17 DIAGNOSIS — G89.29 CHRONIC LEFT-SIDED THORACIC BACK PAIN: ICD-10-CM

## 2024-05-17 PROCEDURE — 99213 OFFICE O/P EST LOW 20 MIN: CPT | Performed by: PHYSICIAN ASSISTANT

## 2024-05-17 RX ORDER — LEVOCETIRIZINE DIHYDROCHLORIDE 5 MG/1
5 TABLET, FILM COATED ORAL EVERY EVENING
COMMUNITY

## 2024-05-17 RX ORDER — AMOXICILLIN 500 MG/1
1000 CAPSULE ORAL 2 TIMES DAILY
Qty: 28 CAPSULE | Refills: 0 | Status: SHIPPED | OUTPATIENT
Start: 2024-05-17 | End: 2024-05-24

## 2024-05-17 NOTE — PROGRESS NOTES
Subjective   Chief Complaint   Patient presents with    Nasal Congestion     Over 1 week    Sore Throat       History of Present Illness        Pt is here today with sore throat, cough, congestion and bilateral ear pain x 1 week. Has been taking xyzal, flonase, dayquil and nyquil. Cough is now productive. No fever or chills. Also, back pain continues despite PT.   Patient Active Problem List   Diagnosis    Right upper quadrant abdominal pain    Nausea and vomiting       Allergies   Allergen Reactions    Cefdinir Itching       Current Outpatient Medications on File Prior to Visit   Medication Sig Dispense Refill    cyclobenzaprine (FLEXERIL) 10 MG tablet Take 1 tablet by mouth 3 (Three) Times a Day As Needed for Muscle Spasms. 30 tablet 0    Ritu 24 Fe 1-20 MG-MCG(24) per tablet Take 1 tablet by mouth Daily. 84 tablet 3    levocetirizine (Xyzal Allergy 24HR) 5 MG tablet Take 1 tablet by mouth Every Evening.      [DISCONTINUED] cetirizine (zyrTEC) 10 MG tablet Take 1 tablet by mouth Daily.       No current facility-administered medications on file prior to visit.       Past Medical History:   Diagnosis Date    GERD (gastroesophageal reflux disease)        Family History   Problem Relation Age of Onset    No Known Problems Mother     No Known Problems Father        Social History     Socioeconomic History    Marital status: Single   Tobacco Use    Smoking status: Never    Smokeless tobacco: Never   Vaping Use    Vaping status: Never Used   Substance and Sexual Activity    Alcohol use: Never    Sexual activity: Defer       Past Surgical History:   Procedure Laterality Date    ENDOSCOPY N/A 09/22/2021    Procedure: ESOPHAGOGASTRODUODENOSCOPY;  Surgeon: Román Monroy MD;  Location: [facility] [place];  Service: Gastroenterology;  Laterality: N/A;    KNEE ARTHROSCOPY Right     SEPTOPLASTY      2023         The following portions of the patient's history were reviewed and updated as appropriate: problem list, allergies,  current medications, past medical history, past family history, past social history, and past surgical history.    ROS    See HPI    Immunization History   Administered Date(s) Administered    COVID-19 (LINDSEY) 04/07/2021    DTaP 2001, 2001, 2001, 12/09/2002, 06/03/2005    Flu Vaccine Quad PF 6-35MO 01/27/2017, 10/23/2018    Fluzone (or Fluarix & Flulaval for VFC) >6mos 10/01/2020    H1N1 Nasal 12/21/2009    HPV Quadrivalent 03/24/2015    Hep A, 2 Dose 06/02/2006, 05/29/2007    Hep B, Adolescent or Pediatric 2001, 2001, 05/28/2002    HiB 2001, 2001, 2001, 05/28/2002    Hpv9 03/24/2015, 07/02/2015, 03/29/2016    IPV 2001, 2001, 12/09/2002, 06/03/2005    Influenza LAIV (Nasal) 10/14/2008, 09/25/2009    Influenza Seasonal Injectable 11/19/2005    MMR 09/04/2002, 06/03/2005    Meningococcal B,(Bexsero) 07/23/2018, 07/24/2019    Meningococcal Conjugate 06/08/2012    Meningococcal MCV4P (Menactra) 07/20/2017    Tdap 06/08/2012, 08/10/2022    Varicella 09/04/2002, 05/29/2007       Objective   Vitals:    05/17/24 1323   Pulse: 82   Temp: 98.8 °F (37.1 °C)   SpO2: 98%     There is no height or weight on file to calculate BMI.  Physical Exam  Vitals reviewed.   Constitutional:       Appearance: She is well-developed.   HENT:      Head: Normocephalic and atraumatic.      Right Ear: Ear canal normal.      Left Ear: Tympanic membrane is erythematous.      Mouth/Throat:      Mouth: Mucous membranes are moist.      Pharynx: Oropharynx is clear. No posterior oropharyngeal erythema.   Cardiovascular:      Rate and Rhythm: Normal rate and regular rhythm.      Heart sounds: Normal heart sounds, S1 normal and S2 normal.   Pulmonary:      Effort: Pulmonary effort is normal.      Breath sounds: Normal breath sounds.   Skin:     General: Skin is warm.   Neurological:      Mental Status: She is alert.   Psychiatric:         Behavior: Behavior normal.           Assessment & Plan    Diagnoses and all orders for this visit:    1. Upper respiratory tract infection, unspecified type (Primary)  -     amoxicillin (AMOXIL) 500 MG capsule; Take 2 capsules by mouth 2 (Two) Times a Day for 7 days.  Dispense: 28 capsule; Refill: 0    2. Left flank pain  -     Ambulatory Referral to Sports Medicine    3. Chronic left-sided thoracic back pain  -     Ambulatory Referral to Sports Medicine     Txt URI with Amoxil. Ongoing back pain that is not improved with PT, will refer her to sports med for further evaluation and to see if MRI would be warranted.     No follow-ups on file.

## 2024-05-22 ENCOUNTER — TREATMENT (OUTPATIENT)
Dept: PHYSICAL THERAPY | Facility: CLINIC | Age: 24
End: 2024-05-22
Payer: COMMERCIAL

## 2024-05-22 DIAGNOSIS — R10.9 LEFT FLANK PAIN: Primary | ICD-10-CM

## 2024-05-22 PROCEDURE — 97110 THERAPEUTIC EXERCISES: CPT | Performed by: PHYSICAL THERAPIST

## 2024-05-22 PROCEDURE — 97530 THERAPEUTIC ACTIVITIES: CPT | Performed by: PHYSICAL THERAPIST

## 2024-05-22 PROCEDURE — 97140 MANUAL THERAPY 1/> REGIONS: CPT | Performed by: PHYSICAL THERAPIST

## 2024-05-22 NOTE — PROGRESS NOTES
Physical Therapy Daily Treatment Note  Visit: 7        Patient: Monica Cerna   : 2000  Diagnosis/ICD-10 Code:  Left flank pain [R10.9]  Referring practitioner: Eliana Cagle PA-C  Date of Initial Visit: Type: THERAPY  Noted: 2024  Today's Date: 2024  Patient seen for 7 sessions       Visit Diagnoses:    ICD-10-CM ICD-9-CM   1. Left flank pain  R10.9 789.09       Subjective     Monica Cerna reports: she has been coughing a lot lately and her flank pain has increased since. MD appt next week.    Objective   See Exercise, Manual, and Modality Logs for complete treatment.       Assessment/Plan  Cont stretching and joint mobilization to lumbar spine and paraspinal mm with good response, however relief has been temporary. Cont to advise heat and TENS unit as needed. Will adjust POC following MD appt if indicated.     Progress per Plan of Care.         Timed:  Manual Therapy:    10     mins  63927;  Therapeutic Exercise:    8     mins  67373;     Therapeutic Activity:     8     mins  30469;         Timed Treatment:   26   mins   Total Treatment:     26   mins        Kylee Dooley PT, DPT  Physical Therapist  PT license: [place] [id number]

## 2024-06-04 ENCOUNTER — OFFICE VISIT (OUTPATIENT)
Dept: SPORTS MEDICINE | Facility: CLINIC | Age: 24
End: 2024-06-04
Payer: COMMERCIAL

## 2024-06-04 VITALS
SYSTOLIC BLOOD PRESSURE: 100 MMHG | OXYGEN SATURATION: 99 % | HEART RATE: 88 BPM | HEIGHT: 64 IN | RESPIRATION RATE: 16 BRPM | BODY MASS INDEX: 29.71 KG/M2 | DIASTOLIC BLOOD PRESSURE: 60 MMHG | WEIGHT: 174 LBS

## 2024-06-04 DIAGNOSIS — M54.50 LUMBAR PAIN: Primary | ICD-10-CM

## 2024-06-04 DIAGNOSIS — S39.012A LUMBAR STRAIN, INITIAL ENCOUNTER: ICD-10-CM

## 2024-06-04 PROCEDURE — 99214 OFFICE O/P EST MOD 30 MIN: CPT | Performed by: FAMILY MEDICINE

## 2024-06-04 NOTE — PROGRESS NOTES
"Monica is a 24 y.o. year old female presents to [facility]    Chief Complaint   Patient presents with    Lumbar Spine - Pain, Initial Evaluation     Referral from PCP for eval of chronic left lower back pain, also reporting LT flank pain - NKI, pain since 02/2024  - has been doing formal PT with minimal relief - ruled out kidney stones with PCP, no neuro sxs reported - here with new x-rays for further evaluation and treatment          History of Present Illness  History of Present Illness  The patient is a 24-year-old female who presents for evaluation of back pain.    The patient began experiencing left-sided back pain towards the end of 02/2024, coinciding with a sudden onset of a popping sensation in her back while running outdoors on flat ground. Despite her history of ovarian cysts and other related issues, she initially attributed the pain to this activity, which was later attributed to a pulled muscle. However, the persistent pain has persisted, prompting her to seek further evaluation. She has undergone 8 to 8 sessions of physical therapy, during which she has been performing stretches, applying heat, and using a TENS unit nightly. The physical therapist also performed massages on her back. The pain does not radiate down her leg. Post-treatment with muscle relaxers, she was able to manage the pain for 1 to 2 months. However, upon depletion of her prescribed muscle relaxers, she has refrained from taking any other medications. She occasionally takes ibuprofen for pain management. The pain occasionally radiates across her lower back, but she is uncertain if this is due to compensation. She denies experiencing nocturnal pain.    I have reviewed the patient's medical, family, and social history in detail and updated the computerized patient record.    /60 (BP Location: Left arm, Patient Position: Sitting, Cuff Size: Adult)   Pulse 88   Resp 16   Ht 162.6 cm (64.02\")   " Wt 78.9 kg (174 lb)   SpO2 99%   BMI 29.85 kg/m²      Physical Exam    Vital signs reviewed.   General: No acute distress.  Eyes: conjunctiva clear; pupils equally round and reactive  ENT: external ears atraumatic  CV: no peripheral edema  Resp: normal respiratory effort, no use of accessory muscles  Skin: no rashes or wounds; normal turgor  Psych: mood and affect appropriate; recent and remote memory intact  Neuro: sensation to light touch intact    MSK Exam  Physical Exam  The lumbar spine shows left-sided paraspinal tenderness. There is no somatic dysfunction. Negative straight leg raise bilaterally. Negative YASMANY and FADIR. Deep tendon reflexes 2+ L4-S1 bilaterally. Negative Trendelenburg bilaterally. Positive stork sign on the left.    Lumbar Spine X-Ray  Indication: Pain  Views: AP and Lateral    Findings:  No fracture  No bony lesion  Normal soft tissues  Normal disc spaces    No prior studies were available for comparison.        Results  Imaging  X-ray of the lumbar spine shows no abnormalities.         Diagnoses and all orders for this visit:    Lumbar pain  -     XR Spine Lumbar 2 or 3 View      Assessment & Plan  1. Left-sided low back pain.  Upon examination, the patient's lumbar spine appears to be in a healthy condition. An MRI of the lumbar spine will be ordered to further investigate the cause of the pain. The patient will be informed of the results upon their availability. In the event that the MRI results are unremarkable, the patient is advised to continue with her physical therapy regimen and take anti-inflammatory medications as needed. The patient is cleared to resume her normal activities without any restrictions.          Follow Up     Patient was given instructions and counseling regarding her condition or for health maintenance advice. Please see specific information pulled into the AVS if appropriate.     Patient or patient representative verbalized consent for the use of Ambient  Listening during the visit with  KHANH Wheat Jr., DO for chart documentation. 6/4/2024  10:45 EDT

## 2024-06-06 ENCOUNTER — PATIENT ROUNDING (BHMG ONLY) (OUTPATIENT)
Dept: SPORTS MEDICINE | Facility: CLINIC | Age: 24
End: 2024-06-06
Payer: COMMERCIAL

## 2024-06-06 NOTE — PROGRESS NOTES
June 6, 2024    A [employer] Message has been sent to the patient for PATIENT ROUNDING with [facility]

## 2024-06-11 ENCOUNTER — TELEPHONE (OUTPATIENT)
Dept: SPORTS MEDICINE | Facility: CLINIC | Age: 24
End: 2024-06-11

## 2024-06-11 NOTE — TELEPHONE ENCOUNTER
Caller: FREDDY    Relationship: STEVE Lu call back number:     What orders are you requesting (i.e. lab or imaging): MRI LUMBAR SPINE    In what timeframe would the patient need to come in: ASAP    Where will you receive your lab/imaging services: [facility]    FAX: [phone]

## 2024-06-12 NOTE — TELEPHONE ENCOUNTER
PA has been updated and I updated the referral and faxed order to the patient requested location.  No further action needed at this time.  -Gisselle

## 2024-07-22 ENCOUNTER — OFFICE VISIT (OUTPATIENT)
Dept: INTERNAL MEDICINE | Facility: CLINIC | Age: 24
End: 2024-07-22
Payer: COMMERCIAL

## 2024-07-22 VITALS
WEIGHT: 167 LBS | OXYGEN SATURATION: 99 % | HEIGHT: 64 IN | DIASTOLIC BLOOD PRESSURE: 70 MMHG | TEMPERATURE: 98.6 F | BODY MASS INDEX: 28.51 KG/M2 | SYSTOLIC BLOOD PRESSURE: 110 MMHG | HEART RATE: 76 BPM

## 2024-07-22 DIAGNOSIS — J06.9 UPPER RESPIRATORY TRACT INFECTION, UNSPECIFIED TYPE: Primary | ICD-10-CM

## 2024-07-22 PROCEDURE — 99213 OFFICE O/P EST LOW 20 MIN: CPT | Performed by: PHYSICIAN ASSISTANT

## 2024-07-22 RX ORDER — AZITHROMYCIN 250 MG/1
TABLET, FILM COATED ORAL
Qty: 6 TABLET | Refills: 0 | Status: SHIPPED | OUTPATIENT
Start: 2024-07-22

## 2024-07-22 NOTE — PROGRESS NOTES
Subjective   Chief Complaint   Patient presents with    Sore Throat    Nasal Congestion     X1 week       History of Present Illness     Pt is here today with sore throat, nasal congestion x 1 week. Slight cough today. Works in a  during the summer. No fever or chills. Ears are starting to hurt.      Patient Active Problem List   Diagnosis    Right upper quadrant abdominal pain    Nausea and vomiting       Allergies   Allergen Reactions    Cefdinir Itching       Current Outpatient Medications on File Prior to Visit   Medication Sig Dispense Refill    Ritu 24 Fe 1-20 MG-MCG(24) per tablet Take 1 tablet by mouth Daily. 84 tablet 3    levocetirizine (Xyzal Allergy 24HR) 5 MG tablet Take 1 tablet by mouth Every Evening.      [DISCONTINUED] cyclobenzaprine (FLEXERIL) 10 MG tablet Take 1 tablet by mouth 3 (Three) Times a Day As Needed for Muscle Spasms. 30 tablet 0     No current facility-administered medications on file prior to visit.       Past Medical History:   Diagnosis Date    Allergic     Ankle sprain     Brain concussion     Elbow sprain     GERD (gastroesophageal reflux disease)     Peptic ulceration 2021    Urinary tract infection        Family History   Problem Relation Age of Onset    Thyroid disease Mother     Hyperlipidemia Father     Cancer Paternal Grandmother        Social History     Socioeconomic History    Marital status: Single   Tobacco Use    Smoking status: Never    Smokeless tobacco: Never   Vaping Use    Vaping status: Never Used   Substance and Sexual Activity    Alcohol use: Never    Drug use: Never    Sexual activity: Never       Past Surgical History:   Procedure Laterality Date    ENDOSCOPY N/A 09/22/2021    Procedure: ESOPHAGOGASTRODUODENOSCOPY;  Surgeon: Román Monroy MD;  Location: [facility] [place] [facility]IN OR;  Service: Gastroenterology;  Laterality: N/A;    KNEE ARTHROSCOPY Right     SEPTOPLASTY      2023    SEPTOPLASTY  06/2023         The following portions of the patient's history  "were reviewed and updated as appropriate: problem list, allergies, current medications, past medical history, past family history, past social history, and past surgical history.    ROS    See HPI    Immunization History   Administered Date(s) Administered    COVID-19 (LINDSEY) 04/07/2021    DTaP 2001, 2001, 2001, 12/09/2002, 06/03/2005    Flu Vaccine Quad PF 6-35MO 01/27/2017, 10/23/2018    Fluzone (or Fluarix & Flulaval for VFC) >6mos 10/01/2020    H1N1 Nasal 12/21/2009    HPV Quadrivalent 03/24/2015    Hep A, 2 Dose 06/02/2006, 05/29/2007    Hep B, Adolescent or Pediatric 2001, 2001, 05/28/2002    HiB 2001, 2001, 2001, 05/28/2002    Hpv9 03/24/2015, 07/02/2015, 03/29/2016    IPV 2001, 2001, 12/09/2002, 06/03/2005    Influenza LAIV (Nasal) 10/14/2008, 09/25/2009    Influenza Seasonal Injectable 11/19/2005    MMR 09/04/2002, 06/03/2005    Meningococcal B,(Bexsero) 07/23/2018, 07/24/2019    Meningococcal Conjugate 06/08/2012    Meningococcal MCV4P (Menactra) 07/20/2017    Tdap 06/08/2012, 08/10/2022    Varicella 09/04/2002, 05/29/2007       Objective   Vitals:    07/22/24 1318   BP: 110/70   Pulse: 76   Temp: 98.6 °F (37 °C)   SpO2: 99%   Weight: 75.8 kg (167 lb)   Height: 162.6 cm (64.02\")     Body mass index is 28.65 kg/m².  Physical Exam  Vitals reviewed.   Constitutional:       Appearance: Normal appearance.   HENT:      Head: Normocephalic and atraumatic.      Right Ear: Ear canal normal.      Left Ear: Ear canal normal.      Mouth/Throat:      Pharynx: Posterior oropharyngeal erythema present.   Neck:      Comments: Shotty adenopathy  Cardiovascular:      Rate and Rhythm: Normal rate and regular rhythm.      Heart sounds: Normal heart sounds.   Pulmonary:      Effort: Pulmonary effort is normal.      Breath sounds: Normal breath sounds.   Neurological:      Mental Status: She is alert.           Assessment & Plan   Diagnoses and all orders for this " visit:    1. Upper respiratory tract infection, unspecified type (Primary)  -     azithromycin (Zithromax Z-Mani) 250 MG tablet; Take 2 tablets by mouth on day 1, then 1 tablet daily on days 2-5  Dispense: 6 tablet; Refill: 0

## 2024-10-02 ENCOUNTER — OFFICE VISIT (OUTPATIENT)
Dept: INTERNAL MEDICINE | Facility: CLINIC | Age: 24
End: 2024-10-02
Payer: COMMERCIAL

## 2024-10-02 VITALS — BODY MASS INDEX: 29.02 KG/M2 | WEIGHT: 170 LBS | HEIGHT: 64 IN | TEMPERATURE: 97.5 F

## 2024-10-02 DIAGNOSIS — R05.9 COUGH, UNSPECIFIED TYPE: Primary | ICD-10-CM

## 2024-10-02 DIAGNOSIS — J06.9 UPPER RESPIRATORY TRACT INFECTION, UNSPECIFIED TYPE: ICD-10-CM

## 2024-10-02 PROCEDURE — 99213 OFFICE O/P EST LOW 20 MIN: CPT | Performed by: PHYSICIAN ASSISTANT

## 2024-10-02 PROCEDURE — 87428 SARSCOV & INF VIR A&B AG IA: CPT | Performed by: PHYSICIAN ASSISTANT

## 2024-10-02 RX ORDER — CETIRIZINE HYDROCHLORIDE 10 MG/1
10 TABLET ORAL DAILY
COMMUNITY

## 2024-10-02 RX ORDER — AMOXICILLIN 500 MG/1
500 CAPSULE ORAL 2 TIMES DAILY
Qty: 14 CAPSULE | Refills: 0 | Status: SHIPPED | OUTPATIENT
Start: 2024-10-02 | End: 2024-10-09

## 2024-10-02 RX ORDER — DICLOFENAC SODIUM 75 MG/1
75 TABLET, DELAYED RELEASE ORAL 2 TIMES DAILY
COMMUNITY
Start: 2024-09-06

## 2024-10-02 NOTE — PROGRESS NOTES
Subjective   Chief Complaint   Patient presents with    Sinusitis     Onset 1 week ago//cough//sinus pressure        History of Present Illness      Pt is here today with head pressure, sinus congestion for 1 week. No coughing with some productive sputum and bilateral ear pain. No fever or chills.     Patient Active Problem List   Diagnosis    Right upper quadrant abdominal pain    Nausea and vomiting       Allergies   Allergen Reactions    Cefdinir Itching       Current Outpatient Medications on File Prior to Visit   Medication Sig Dispense Refill    cetirizine (ZyrTEC Allergy) 10 MG tablet Take 1 tablet by mouth Daily.      diclofenac (VOLTAREN) 75 MG EC tablet Take 1 tablet by mouth 2 (Two) Times a Day.      Ritu 24 Fe 1-20 MG-MCG(24) per tablet Take 1 tablet by mouth Daily. 84 tablet 3    [DISCONTINUED] azithromycin (Zithromax Z-Mani) 250 MG tablet Take 2 tablets by mouth on day 1, then 1 tablet daily on days 2-5 6 tablet 0    [DISCONTINUED] levocetirizine (Xyzal Allergy 24HR) 5 MG tablet Take 1 tablet by mouth Every Evening.       No current facility-administered medications on file prior to visit.       Past Medical History:   Diagnosis Date    Allergic     Ankle sprain     Brain concussion     Elbow sprain     GERD (gastroesophageal reflux disease)     Peptic ulceration 2021    Urinary tract infection        Family History   Problem Relation Age of Onset    Thyroid disease Mother     Hyperlipidemia Father     Cancer Paternal Grandmother        Social History     Socioeconomic History    Marital status: Single   Tobacco Use    Smoking status: Never    Smokeless tobacco: Never   Vaping Use    Vaping status: Never Used   Substance and Sexual Activity    Alcohol use: Never    Drug use: Never    Sexual activity: Never       Past Surgical History:   Procedure Laterality Date    ENDOSCOPY N/A 09/22/2021    Procedure: ESOPHAGOGASTRODUODENOSCOPY;  Surgeon: Román Monroy MD;  Location: [facility] [place] MAIN OR;  Service:  "Gastroenterology;  Laterality: N/A;    KNEE ARTHROSCOPY Right     SEPTOPLASTY      2023    SEPTOPLASTY  06/2023         The following portions of the patient's history were reviewed and updated as appropriate: problem list, allergies, current medications, past medical history, past family history, past social history, and past surgical history.    ROS    See HPI    Immunization History   Administered Date(s) Administered    COVID-19 (LINDSEY) 04/07/2021    DTaP 2001, 2001, 2001, 12/09/2002, 06/03/2005    Flu Vaccine Quad PF 6-35MO 01/27/2017, 10/23/2018    FluMist 2-49yrs (Nasal) 10/14/2008, 09/25/2009    Fluzone (or Fluarix & Flulaval for VFC) >6mos 10/01/2020    H1N1 Nasal 12/21/2009    HPV Quadrivalent 03/24/2015    Hep A, 2 Dose 06/02/2006, 05/29/2007    Hep B, Adolescent or Pediatric 2001, 2001, 05/28/2002    HiB 2001, 2001, 2001, 05/28/2002    Hpv9 03/24/2015, 07/02/2015, 03/29/2016    IPV 2001, 2001, 12/09/2002, 06/03/2005    Influenza Seasonal Injectable 11/19/2005    MMR 09/04/2002, 06/03/2005    Meningococcal B,(Bexsero) 07/23/2018, 07/24/2019    Meningococcal Conjugate 06/08/2012    Meningococcal MCV4P (Menactra) 07/20/2017    Tdap 06/08/2012, 08/10/2022    Varicella 09/04/2002, 05/29/2007       Objective   Vitals:    10/02/24 1021   Temp: 97.5 °F (36.4 °C)   Weight: 77.1 kg (170 lb)   Height: 162.6 cm (64.02\")     Body mass index is 29.17 kg/m².  Physical Exam  Vitals reviewed.   Constitutional:       Appearance: She is well-developed.   HENT:      Head: Normocephalic and atraumatic.      Right Ear: Tympanic membrane and ear canal normal.      Left Ear: Ear canal normal. Tympanic membrane is erythematous and bulging.   Cardiovascular:      Rate and Rhythm: Normal rate and regular rhythm.      Heart sounds: Normal heart sounds, S1 normal and S2 normal.   Pulmonary:      Effort: Pulmonary effort is normal.      Breath sounds: Normal breath sounds. "   Skin:     General: Skin is warm.   Neurological:      Mental Status: She is alert.   Psychiatric:         Behavior: Behavior normal.           Assessment & Plan   Diagnoses and all orders for this visit:    1. Cough, unspecified type (Primary)  -     POCT SARS-CoV-2 Antigen REGINALD + Flu    2. Upper respiratory tract infection, unspecified type  -     amoxicillin (AMOXIL) 500 MG capsule; Take 1 capsule by mouth 2 (Two) Times a Day for 7 days.  Dispense: 14 capsule; Refill: 0

## 2024-10-07 DIAGNOSIS — R05.9 COUGH, UNSPECIFIED TYPE: Primary | ICD-10-CM

## 2024-10-07 RX ORDER — BENZONATATE 100 MG/1
100 CAPSULE ORAL 3 TIMES DAILY PRN
Qty: 30 CAPSULE | Refills: 0 | Status: SHIPPED | OUTPATIENT
Start: 2024-10-07

## 2024-12-05 ENCOUNTER — TRANSCRIBE ORDERS (OUTPATIENT)
Dept: PHYSICAL THERAPY | Facility: CLINIC | Age: 24
End: 2024-12-05
Payer: COMMERCIAL

## 2024-12-05 DIAGNOSIS — S93.401A SPRAIN OF RIGHT ANKLE, UNSPECIFIED LIGAMENT, INITIAL ENCOUNTER: Primary | ICD-10-CM

## 2024-12-09 ENCOUNTER — OFFICE VISIT (OUTPATIENT)
Dept: INTERNAL MEDICINE | Facility: CLINIC | Age: 24
End: 2024-12-09
Payer: COMMERCIAL

## 2024-12-09 VITALS
SYSTOLIC BLOOD PRESSURE: 114 MMHG | DIASTOLIC BLOOD PRESSURE: 76 MMHG | OXYGEN SATURATION: 99 % | TEMPERATURE: 98.9 F | HEART RATE: 68 BPM

## 2024-12-09 DIAGNOSIS — J06.9 UPPER RESPIRATORY TRACT INFECTION, UNSPECIFIED TYPE: Primary | ICD-10-CM

## 2024-12-09 PROCEDURE — 99213 OFFICE O/P EST LOW 20 MIN: CPT | Performed by: PHYSICIAN ASSISTANT

## 2024-12-09 RX ORDER — AZITHROMYCIN 250 MG/1
TABLET, FILM COATED ORAL
Qty: 6 TABLET | Refills: 0 | Status: SHIPPED | OUTPATIENT
Start: 2024-12-09

## 2024-12-09 NOTE — PROGRESS NOTES
Subjective   Chief Complaint   Patient presents with    Nasal Congestion    Sore Throat    Earache    Cough     X1 1/2 weeks. productive       History of Present Illness     Has been sick for a week and a half. Has a cough that is productive, still working in a . No fevers currently.      Patient Active Problem List   Diagnosis    Right upper quadrant abdominal pain    Nausea and vomiting       Allergies   Allergen Reactions    Cefdinir Itching       Current Outpatient Medications on File Prior to Visit   Medication Sig Dispense Refill    cetirizine (ZyrTEC Allergy) 10 MG tablet Take 1 tablet by mouth Daily.      Ritu 24 Fe 1-20 MG-MCG(24) per tablet Take 1 tablet by mouth Daily. 84 tablet 3    [DISCONTINUED] benzonatate (Tessalon Perles) 100 MG capsule Take 1 capsule by mouth 3 (Three) Times a Day As Needed for Cough. 30 capsule 0    [DISCONTINUED] diclofenac (VOLTAREN) 75 MG EC tablet Take 1 tablet by mouth 2 (Two) Times a Day.       No current facility-administered medications on file prior to visit.       Past Medical History:   Diagnosis Date    Allergic     Ankle sprain     Brain concussion     Elbow sprain     GERD (gastroesophageal reflux disease)     Peptic ulceration 2021    Urinary tract infection        Family History   Problem Relation Age of Onset    Thyroid disease Mother     Hyperlipidemia Father     Cancer Paternal Grandmother        Social History     Socioeconomic History    Marital status: Single   Tobacco Use    Smoking status: Never    Smokeless tobacco: Never   Vaping Use    Vaping status: Never Used   Substance and Sexual Activity    Alcohol use: Never    Drug use: Never    Sexual activity: Never       Past Surgical History:   Procedure Laterality Date    ENDOSCOPY N/A 09/22/2021    Procedure: ESOPHAGOGASTRODUODENOSCOPY;  Surgeon: Román Monroy MD;  [place] [facility] [place];  Service: Gastroenterology;  Laterality: N/A;    KNEE ARTHROSCOPY Right     SEPTOPLASTY      2023     SEPTOPLASTY  06/2023         The following portions of the patient's history were reviewed and updated as appropriate: problem list, allergies, current medications, past medical history, past family history, past social history, and past surgical history.    ROS    See HPI    Immunization History   Administered Date(s) Administered    COVID-19 (LINDSEY) 04/07/2021    DTaP 2001, 2001, 2001, 12/09/2002, 06/03/2005    Flu Vaccine Quad PF 6-35MO 01/27/2017, 10/23/2018    FluMist 2-49yrs (Nasal) 10/14/2008, 09/25/2009    Fluzone (or Fluarix & Flulaval for VFC) >6mos 10/01/2020    H1N1 Nasal 12/21/2009    HPV Quadrivalent 03/24/2015    Hep A, 2 Dose 06/02/2006, 05/29/2007    Hep B, Adolescent or Pediatric 2001, 2001, 05/28/2002    HiB 2001, 2001, 2001, 05/28/2002    Hpv9 03/24/2015, 07/02/2015, 03/29/2016    IPV 2001, 2001, 12/09/2002, 06/03/2005    Influenza Seasonal Injectable 11/19/2005    MMR 09/04/2002, 06/03/2005    Meningococcal B,(Bexsero) 07/23/2018, 07/24/2019    Meningococcal Conjugate 06/08/2012    Meningococcal MCV4P (Menactra) 07/20/2017    Tdap 06/08/2012, 08/10/2022    Varicella 09/04/2002, 05/29/2007       Objective   Vitals:    12/09/24 1510   BP: 114/76   Pulse: 68   Temp: 98.9 °F (37.2 °C)   SpO2: 99%     There is no height or weight on file to calculate BMI.  Physical Exam  Vitals reviewed.   Constitutional:       Appearance: She is well-developed.   HENT:      Head: Normocephalic and atraumatic.      Right Ear: Ear canal normal.      Left Ear: Ear canal normal.   Cardiovascular:      Rate and Rhythm: Normal rate and regular rhythm.      Heart sounds: Normal heart sounds, S1 normal and S2 normal.   Pulmonary:      Effort: Pulmonary effort is normal.      Breath sounds: Normal breath sounds.   Skin:     General: Skin is warm.   Neurological:      Mental Status: She is alert.   Psychiatric:         Behavior: Behavior normal.         Assessment  & Plan   Diagnoses and all orders for this visit:    1. Upper respiratory tract infection, unspecified type (Primary)    Other orders  -     azithromycin (Zithromax Z-Mani) 250 MG tablet; Take 2 tablets by mouth on day 1, then 1 tablet daily on days 2-5  Dispense: 6 tablet; Refill: 0

## 2024-12-16 ENCOUNTER — HOSPITAL ENCOUNTER (OUTPATIENT)
Facility: HOSPITAL | Age: 24
Discharge: HOME | End: 2024-12-16
Admitting: PHYSICIAN ASSISTANT
Payer: COMMERCIAL

## 2024-12-16 DIAGNOSIS — J06.9 UPPER RESPIRATORY TRACT INFECTION, UNSPECIFIED TYPE: Primary | ICD-10-CM

## 2024-12-16 DIAGNOSIS — R05.9 COUGH, UNSPECIFIED TYPE: ICD-10-CM

## 2024-12-16 PROCEDURE — 71046 X-RAY EXAM CHEST 2 VIEWS: CPT

## 2024-12-19 ENCOUNTER — TREATMENT (OUTPATIENT)
Dept: PHYSICAL THERAPY | Facility: CLINIC | Age: 24
End: 2024-12-19
Payer: COMMERCIAL

## 2024-12-19 DIAGNOSIS — M25.571 ACUTE RIGHT ANKLE PAIN: Primary | ICD-10-CM

## 2024-12-19 NOTE — PROGRESS NOTES
Physical Therapy Initial Evaluation and Plan of Care    Patient: Monica Cerna   : 2000  Diagnosis/ICD-10 Code:  Acute right ankle pain [M25.571]  Referring practitioner: Peggy Concepcion DPM  Date of Initial Visit: 2024  Today's Date: 2024  Patient seen for 1 sessions         Visit Diagnoses:    ICD-10-CM ICD-9-CM   1. Acute right ankle pain  M25.571 719.47     338.19       Subjective Questionnaire: FAAM: 88    Subjective Evaluation    History of Present Illness  Mechanism of injury: 24 year old female s/p R ankle sprain in August when walking her dog. States the dog got close to a ditch in the yard and pulled her causing her R ankle to roll inwards. Pt was placed in a lace up ankle brace for 1 month. Pain is much improved, but still lingering as well as instability of R ankle.     Pt reports pain with prolonged standing, walking, running. No pain at rest.     Pain  At worst pain rating: 3  Location: R lateral ankle  Quality: sharp and dull ache  Relieving factors: rest  Aggravating factors: ambulation and standing    Diagnostic Tests  X-ray: normal    Treatments  Previous treatment: immobilization  Patient Goals  Patient goals for therapy: increased strength, decreased pain, independence with ADLs/IADLs, improved balance and return to sport/leisure activities           Objective          Tenderness     Right Ankle/Foot   Tenderness in the anterior talofibular ligament, calcaneofibular ligament and posterior talofibular ligament.     Active Range of Motion   Left Ankle/Foot   Normal active range of motion    Right Ankle/Foot   Normal active range of motion    Strength/Myotome Testing     Left Ankle/Foot   Normal strength    Right Ankle/Foot   Dorsiflexion: 4-  Plantar flexion: 4-  Inversion: 4-  Eversion: 4-    Ambulation     Observational Gait   Gait: within functional limits         Assessment & Plan       Assessment  Impairments: abnormal or restricted ROM, impaired balance, impaired  physical strength, lacks appropriate home exercise program and pain with function   Functional limitations: walking, uncomfortable because of pain, sitting, standing, stooping and unable to perform repetitive tasks   Assessment details: 24 year old female s/p R ankle sprain in August when walking her dog. States the dog got close to a ditch in the yard and pulled her causing her R ankle to roll inwards. Pt was placed in a lace up ankle brace for 1 month. Pain is much improved, but still lingering as well as instability of R ankle. Pt presents to skilled PT with R ankle pain, weakness, and instability. Pt would benefit from skilled PT to address the above findings and improve pt's ease with functional mobility and return to PLOF.    Barriers to therapy: none  Prognosis: good    Goals  Plan Goals: ST. Pt will improve R ankle strength to 4/5 in 2 weeks.  2. Pt will demo good tolerance and compliance with HEP in 2 weeks.    LT.Pt will be independent with HEP in 6 weeks for self management and prevention of re-occurrence.   2.Pt will improve strength of R ankle to 5/5 in 6 weeks.  3. Pt will demo progression to good dynamic balance in 6 weeks.       Plan  Therapy options: will be seen for skilled therapy services  Planned modality interventions: cryotherapy, electrical stimulation/Russian stimulation, thermotherapy (hydrocollator packs) and ultrasound  Planned therapy interventions: flexibility, functional ROM exercises, gait training, home exercise program, joint mobilization, neuromuscular re-education, manual therapy, strengthening, stretching and therapeutic activities  Frequency: 2x week  Duration in weeks: 6  Treatment plan discussed with: patient      History # of Personal Factors and/or Comorbidities: MODERATE (1-2)  Examination of Body System(s): # of elements: LOW (1-2)  Clinical Presentation: STABLE   Clinical Decision Making: LOW       Timed:  Therapeutic Exercise:    23     mins  18480;      Neuromuscular Chad:    8    mins  79237;    Therapeutic Activity:     8     mins  76438;         Untimed:  Low Eval                        8     Mins  99656    Timed Treatment:   39   mins   Total Treatment:     47   mins    PT SIGNATURE: Kylee Dooley PT, DPT  PT license: [place] [id number]        DATE TREATMENT INITIATED: 12/19/2024    Initial Certification  Certification Period: 3/18/2025  I certify that the therapy services are furnished while this patient is under my care.  The services outlined above are required by this patient, and will be reviewed every 90 days.    Physician Signature: __________________________________________________________    PHYSICIAN: Peggy Concepcion DPM      DATE:     Please sign and return via fax to [phone].. Thank you, [facility].  [address] [place] [address]

## 2024-12-31 ENCOUNTER — TREATMENT (OUTPATIENT)
Dept: PHYSICAL THERAPY | Facility: CLINIC | Age: 24
End: 2024-12-31
Payer: COMMERCIAL

## 2024-12-31 DIAGNOSIS — M25.571 ACUTE RIGHT ANKLE PAIN: Primary | ICD-10-CM

## 2024-12-31 NOTE — PROGRESS NOTES
Physical Therapy Daily Treatment Note  Visit: 2        Patient: Monica Cerna   : 2000  Diagnosis/ICD-10 Code:  Acute right ankle pain [M25.571]  Referring practitioner: Peggy Concepcion DPM  Date of Initial Visit: Type: THERAPY  Noted: 2024  Today's Date: 2024  Patient seen for 2 sessions       Visit Diagnoses:    ICD-10-CM ICD-9-CM   1. Acute right ankle pain  M25.571 719.47     338.19       Subjective     Monica Cerna reports: increased soreness on lateral aspect of R ankle. Denies change in activity.     Objective   See Exercise, Manual, and Modality Logs for complete treatment.       Assessment/Plan  Passive stretching, mobs, and HCC utilized for pain management and improved mobility to R ankle today. Advised on stretching and gentle ROM over the next few days until pain subsides.    Progress per Plan of Care.         Timed:  Manual Therapy:    8     mins  82341;  Therapeutic Exercise:    23     mins  52080;     Neuromuscular Chad:    8    mins  09780;        Timed Treatment:   39   mins   Total Treatment:     39   mins        Kylee Dooley PT, DPT  Physical Therapist  PT license: [place] [id number]

## 2025-01-07 ENCOUNTER — TREATMENT (OUTPATIENT)
Dept: PHYSICAL THERAPY | Facility: CLINIC | Age: 25
End: 2025-01-07
Payer: COMMERCIAL

## 2025-01-07 DIAGNOSIS — M25.571 ACUTE RIGHT ANKLE PAIN: Primary | ICD-10-CM

## 2025-01-07 PROCEDURE — 97140 MANUAL THERAPY 1/> REGIONS: CPT | Performed by: PHYSICAL THERAPIST

## 2025-01-07 PROCEDURE — 97110 THERAPEUTIC EXERCISES: CPT | Performed by: PHYSICAL THERAPIST

## 2025-01-07 PROCEDURE — 97112 NEUROMUSCULAR REEDUCATION: CPT | Performed by: PHYSICAL THERAPIST

## 2025-01-07 NOTE — PROGRESS NOTES
Physical Therapy Daily Treatment Note  Visit: 3        Patient: Monica Cerna   : 2000  Diagnosis/ICD-10 Code:  Acute right ankle pain [M25.571]  Referring practitioner: Peggy Concepcion DPM  Date of Initial Visit: Type: THERAPY  Noted: 2024  Today's Date: 2025  Patient seen for 3 sessions       Visit Diagnoses:    ICD-10-CM ICD-9-CM   1. Acute right ankle pain  M25.571 719.47     338.19       Subjective     Monica Cerna reports: less R ankle pain since last visit, but still having some soreness. States interventions last visit helped her pain.     Objective   See Exercise, Manual, and Modality Logs for complete treatment.       Assessment/Plan  Cont previous interventions with good tolerance. Cont to advise on resting for the remaining week and plan to progress balance and strength training next week.     Progress per Plan of Care.         Timed:  Manual Therapy:    8     mins  06828;  Therapeutic Exercise:    23     mins  39663;     Neuromuscular Chad:    8    mins  73359;      Timed Treatment:   39   mins   Total Treatment:     39   mins        Kylee Dooley PT, DPT  Physical Therapist  PT license: [place] [id number]

## 2025-01-14 ENCOUNTER — TREATMENT (OUTPATIENT)
Dept: PHYSICAL THERAPY | Facility: CLINIC | Age: 25
End: 2025-01-14
Payer: COMMERCIAL

## 2025-01-14 DIAGNOSIS — M25.571 ACUTE RIGHT ANKLE PAIN: Primary | ICD-10-CM

## 2025-01-15 NOTE — PROGRESS NOTES
Physical Therapy Daily Treatment Note  Visit: 4        Patient: Monica Cerna   : 2000  Diagnosis/ICD-10 Code:  Acute right ankle pain [M25.571]  Referring practitioner: Peggy Concepcion DPM  Date of Initial Visit: Type: THERAPY  Noted: 2024  Today's Date: 2025  Patient seen for 4 sessions       Visit Diagnoses:    ICD-10-CM ICD-9-CM   1. Acute right ankle pain  M25.571 719.47     338.19       Subjective     Monica Cerna reports: R ankle stiffness, but pain is improved.     Objective   See Exercise, Manual, and Modality Logs for complete treatment.       Assessment/Plan  STM and joint mobilizations performed with good response. Improved gait pattern noted this date. Stability of R ankle is improving.     Progress per Plan of Care.         Timed:  Manual Therapy:    8     mins  36163;  Therapeutic Exercise:    23     mins  34479;     Neuromuscular Chad:    8    mins  61466;        Timed Treatment:   39   mins   Total Treatment:     39   mins        Kylee Dooley PT, DPT  Physical Therapist  PT license: [place] [id number]

## 2025-01-21 ENCOUNTER — TREATMENT (OUTPATIENT)
Dept: PHYSICAL THERAPY | Facility: CLINIC | Age: 25
End: 2025-01-21
Payer: COMMERCIAL

## 2025-01-21 DIAGNOSIS — M25.571 ACUTE RIGHT ANKLE PAIN: Primary | ICD-10-CM

## 2025-01-22 NOTE — PROGRESS NOTES
Physical Therapy Progress Note/ Re-Assessment      Patient: Monica Cerna   : 2000  Diagnosis/ICD-10 Code:  Acute right ankle pain [M25.571]  Referring practitioner: Peggy Concepcion DPM  Date of Initial Visit: 2025  Today's Date: 2025  Patient seen for 5 sessions      Subjective:   Visit Diagnoses:    ICD-10-CM ICD-9-CM   1. Acute right ankle pain  M25.571 719.47     338.19       Monica Cerna reports: cold weather seems to increase her pain as well as stiffness. States the lateral aspect of R ankle still bothers her with lateral movements and prolonged weightbearing activity, but it is greatly improved.   Subjective Questionnaire: FAAM: 96.4  Clinical Progress: improved  Home Program Compliance: Yes  Treatment has included: therapeutic exercise, neuromuscular re-education, manual therapy, therapeutic activity, and gait training    Subjective Evaluation    Pain  At worst pain ratin  Location: R lateral ankle         Objective          Strength/Myotome Testing     Right Ankle/Foot   Dorsiflexion: 4  Plantar flexion: 4  Inversion: 4  Eversion: 4      Assessment & Plan       Assessment  Impairments: abnormal or restricted ROM, impaired balance, impaired physical strength, lacks appropriate home exercise program and pain with function   Functional limitations: walking, uncomfortable because of pain, sitting, standing, stooping and unable to perform repetitive tasks   Assessment details: Pt has attended 5 visits in skilled PT. She has met all STG and is making good progress towards all LTG. Pain in R ankle is gradually improving and strength is progressing appropriately. Pt would benefit from skilled PT to address the above findings and improve pt's ease with functional mobility and return to PLOF.    Barriers to therapy: none  Prognosis: good    Goals  Plan Goals: ST. Pt will improve R ankle strength to 4/5 in 2 weeks.MET  2. Pt will demo good tolerance and compliance with HEP in 2 weeks.  MET    LT.Pt will be independent with HEP in 6 weeks for self management and prevention of re-occurrence.  PROGRESSING  2.Pt will improve strength of R ankle to 5/5 in 6 weeks. PROGRESSING  3. Pt will demo progression to good dynamic balance in 6 weeks. PROGRESSING      Plan  Therapy options: will be seen for skilled therapy services  Planned modality interventions: cryotherapy, electrical stimulation/Russian stimulation, thermotherapy (hydrocollator packs) and ultrasound  Planned therapy interventions: flexibility, functional ROM exercises, gait training, home exercise program, joint mobilization, neuromuscular re-education, manual therapy, strengthening, stretching and therapeutic activities  Frequency: 1x week  Duration in weeks: 4  Treatment plan discussed with: patient    Progress toward previous goals: Partially Met        Recommendations: Continue as planned    Timed:  Manual Therapy:    8     mins  74131;  Therapeutic Exercise:    10     mins  82954;     Neuromuscular Chad:    8    mins  17689;      Untimed:  Re-Eval      8      mins  71662    Timed Treatment:   26   mins   Total Treatment:     34   mins    PT Signature: Kylee Dooley PT, DPT  PT license: [place] [id number]

## 2025-01-28 ENCOUNTER — TREATMENT (OUTPATIENT)
Dept: PHYSICAL THERAPY | Facility: CLINIC | Age: 25
End: 2025-01-28
Payer: COMMERCIAL

## 2025-01-28 DIAGNOSIS — M25.571 ACUTE RIGHT ANKLE PAIN: Primary | ICD-10-CM

## 2025-01-29 NOTE — PROGRESS NOTES
Physical Therapy Daily Treatment Note  Visit: 6        Patient: Monica Cerna   : 2000  Diagnosis/ICD-10 Code:  Acute right ankle pain [M25.571]  Referring practitioner: Peggy Concepcion DPM  Date of Initial Visit: Type: THERAPY  Noted: 2024  Today's Date: 2025  Patient seen for 6 sessions       Visit Diagnoses:    ICD-10-CM ICD-9-CM   1. Acute right ankle pain  M25.571 719.47     338.19       Subjective     Monica Cerna reports: considering surgical intervention at this time due to continued instability.     Objective   See Exercise, Manual, and Modality Logs for complete treatment.       Assessment/Plan  Pt demo normal R ankle strength, but does demo hypomobility of TC and subtalar joints. KT tape performed for improved lateral ankle stability. Discussed HEP and working on balance training.     Progress per Plan of Care.         Timed:  Manual Therapy:    10     mins  21730;  Therapeutic Exercise:    8     mins  71041;     Neuromuscular Chad:    8    mins  87167;        Timed Treatment:   26   mins   Total Treatment:     26   mins        Kylee Dooley PT, DPT  Physical Therapist  PT license: [place] [id number]

## 2025-02-06 ENCOUNTER — TREATMENT (OUTPATIENT)
Dept: PHYSICAL THERAPY | Facility: CLINIC | Age: 25
End: 2025-02-06
Payer: COMMERCIAL

## 2025-02-06 DIAGNOSIS — M25.571 ACUTE RIGHT ANKLE PAIN: Primary | ICD-10-CM

## 2025-02-06 NOTE — PROGRESS NOTES
Physical Therapy Daily Treatment Note  Visit: 7        Patient: Monica Cerna   : 2000  Diagnosis/ICD-10 Code:  Acute right ankle pain [M25.571]  Referring practitioner: Peggy Concepcion DPM  Date of Initial Visit: Type: THERAPY  Noted: 2024  Today's Date: 2025  Patient seen for 7 sessions       Visit Diagnoses:    ICD-10-CM ICD-9-CM   1. Acute right ankle pain  M25.571 719.47     338.19       Subjective     Monica Cerna reports: Having surgery next Thursday on R ankle. States she has been off ibuprofen and her pain in R ankle is much more than before.     Objective   See Exercise, Manual, and Modality Logs for complete treatment.       Assessment/Plan  Focused on addressing pt's pain today. STM, passive stretching and modalities performed. Will be having surgery next week and will return when cleared for ROM.     Progress per Plan of Care.         Timed:  Manual Therapy:    8     mins  81620;  Therapeutic Exercise:    8     mins  97758;     Therapeutic Activity:     8     mins  62381;         Untimed:  Electrical Stimulation:    15     mins  19636 ( );    Timed Treatment:   24   mins   Total Treatment:     39   mins        Kylee Dooley, PT, DPT  Physical Therapist  PT license: [place] [id number]

## 2025-03-10 ENCOUNTER — TRANSCRIBE ORDERS (OUTPATIENT)
Dept: PHYSICAL THERAPY | Facility: CLINIC | Age: 25
End: 2025-03-10
Payer: COMMERCIAL

## 2025-03-10 DIAGNOSIS — S93.491D SPRAIN OF ANTERIOR TALOFIBULAR LIGAMENT OF RIGHT ANKLE, SUBSEQUENT ENCOUNTER: Primary | ICD-10-CM

## 2025-03-17 ENCOUNTER — TREATMENT (OUTPATIENT)
Dept: PHYSICAL THERAPY | Facility: CLINIC | Age: 25
End: 2025-03-17
Payer: COMMERCIAL

## 2025-03-17 DIAGNOSIS — Z47.89 ORTHOPEDIC AFTERCARE: Primary | ICD-10-CM

## 2025-03-17 DIAGNOSIS — S93.491D SPRAIN OF ANTERIOR TALOFIBULAR LIGAMENT OF RIGHT ANKLE, SUBSEQUENT ENCOUNTER: ICD-10-CM

## 2025-03-17 NOTE — PROGRESS NOTES
Physical Therapy Initial Evaluation and Plan of Care    Patient: Monica Cerna   : 2000  Diagnosis/ICD-10 Code:  Orthopedic aftercare [Z47.89]  Referring practitioner: Peggy Concepcion DPM  Date of Initial Visit: 3/17/2025  Today's Date: 3/17/2025  Patient seen for 1 sessions         Visit Diagnoses:    ICD-10-CM ICD-9-CM   1. Orthopedic aftercare  Z47.89 V54.9   2. Sprain of anterior talofibular ligament of right ankle, subsequent encounter  S93.491D V58.89     845.09       Subjective Questionnaire: FAAM: 35.7    Subjective Evaluation    History of Present Illness  Mechanism of injury: 24 year old female s/p R ankle sprain in August when walking her dog. States the dog got close to a ditch in the yard and pulled her causing her R ankle to roll inwards. Pt was placed in a lace up ankle brace for 1 month.She attended PT with little change to her pain. She then underwent ATFL reconstruction on 25. No complications from surgery. Pt was NWB x 2 weeks. She is now WBAT in walking boot.     Follow up with MD Friday    Pain  At worst pain rating: 3  Location: R lateral ankle  Quality: dull ache  Relieving factors: rest and ice  Aggravating factors: ambulation, standing, squatting, stairs and movement    Treatments  Previous treatment: immobilization  Patient Goals  Patient goals for therapy: increased strength, decreased pain, independence with ADLs/IADLs, improved balance, return to sport/leisure activities, decreased edema and increased motion           Objective          Active Range of Motion   Left Ankle/Foot   Normal active range of motion    Right Ankle/Foot   Dorsiflexion (ke): 0 degrees   Plantar flexion: 45 degrees   Inversion: 20 degrees   Eversion: 10 degrees     Strength/Myotome Testing     Left Ankle/Foot   Normal strength    Additional Strength Details  DNT R due to recent surgery    Ambulation   Weight-Bearing Status   Weight-Bearing Status (Right): weight-bearing as tolerated    in walking  boot   Assistive device used: crutches          Assessment & Plan       Assessment  Impairments: abnormal or restricted ROM, impaired balance, impaired physical strength, lacks appropriate home exercise program and pain with function   Functional limitations: walking, uncomfortable because of pain, sitting, standing, stooping and unable to perform repetitive tasks   Assessment details: 24 year old female s/p R ankle sprain in August when walking her dog. States the dog got close to a ditch in the yard and pulled her causing her R ankle to roll inwards. Pt was placed in a lace up ankle brace for 1 month.She attended PT with little change to her pain. She then underwent ATFL reconstruction on 25. No complications from surgery. Pt was NWB x 2 weeks. She is now WBAT in walking boot. Pt presents to skilled PT with typical post operative findings including R ankle pain, decreased AROM, weakness, and instability. Pt would benefit from skilled PT to address the above findings and improve pt's ease with functional mobility and return to PLOF.    Barriers to therapy: none  Prognosis: good    Goals  Plan Goals: ST. Pt will improve R ankle DF AROM to 10 deg in in 2 weeks.  2. Pt will demo good tolerance and compliance with HEP in 2 weeks.  3. Pt will wean from crutches while using walking boot in 2 weeks.    LT.Pt will be independent with HEP in 12 weeks for self management and prevention of re-occurrence.   2.Pt will improve strength of R ankle to 5/5 in 12 weeks for ease with stairs and stability of R ankle joint.  3. Pt will demo normalized gait pattern without AD or walking boot in 12 weeks for return to PLOF.  4. Pt will demo full AROM of R ankle in all planes in 12 weeks for improved gait pattern and ease with stairs.  5. Pt will demo progression to good dynamic balance in 12 weeks to decrease risk of re-injury.       Plan  Therapy options: will be seen for skilled therapy services  Planned modality  interventions: cryotherapy, electrical stimulation/Russian stimulation and thermotherapy (hydrocollator packs)  Planned therapy interventions: flexibility, functional ROM exercises, gait training, home exercise program, joint mobilization, neuromuscular re-education, manual therapy, strengthening, stretching, therapeutic activities and balance/weight-bearing training  Frequency: 2x week  Duration in weeks: 12  Treatment plan discussed with: patient      History # of Personal Factors and/or Comorbidities: MODERATE (1-2)  Examination of Body System(s): # of elements: LOW (1-2)  Clinical Presentation: STABLE   Clinical Decision Making: LOW       Timed:  Manual Therapy:    8     mins  50633;  Therapeutic Exercise:    8     mins  29130;     Therapeutic Activity:     8     mins  00040;       Untimed:  Low Eval                        8     Mins  00471    Timed Treatment:   24   mins   Total Treatment:     32   mins    PT SIGNATURE: Kylee Dooley PT, DPT  PT license: [place] [id number]        DATE TREATMENT INITIATED: 3/17/2025    Initial Certification  Certification Period: 6/14/2025  I certify that the therapy services are furnished while this patient is under my care.  The services outlined above are required by this patient, and will be reviewed every 90 days.    Physician Signature: __________________________________________________________    PHYSICIAN: Peggy Concepcion DPM      DATE:     Please sign and return via fax to [phone].. Thank you, [facility].  [address] [place] [address]

## 2025-03-27 ENCOUNTER — TREATMENT (OUTPATIENT)
Dept: PHYSICAL THERAPY | Facility: CLINIC | Age: 25
End: 2025-03-27
Payer: COMMERCIAL

## 2025-03-27 DIAGNOSIS — Z47.89 ORTHOPEDIC AFTERCARE: Primary | ICD-10-CM

## 2025-03-27 DIAGNOSIS — S93.491D SPRAIN OF ANTERIOR TALOFIBULAR LIGAMENT OF RIGHT ANKLE, SUBSEQUENT ENCOUNTER: ICD-10-CM

## 2025-03-27 NOTE — PROGRESS NOTES
Physical Therapy Daily Treatment Note  Visit: 2        Patient: Monica Cerna   : 2000  Diagnosis/ICD-10 Code:  Orthopedic aftercare [Z47.89]  Referring practitioner: Peggy Concepcion DPM  Date of Initial Visit: Type: THERAPY  Noted: 3/17/2025  Today's Date: 3/27/2025  Patient seen for 2 sessions       Visit Diagnoses:    ICD-10-CM ICD-9-CM   1. Orthopedic aftercare  Z47.89 V54.9   2. Sprain of anterior talofibular ligament of right ankle, subsequent encounter  S93.491D V58.89     845.09       Subjective     Monica Cerna reports: no pain in R ankle today.     Objective   See Exercise, Manual, and Modality Logs for complete treatment.       Assessment/Plan  Added weight shifts to tolerance today in shoe for pre-gait training. Cont ROM and gentle stretching with good tolerance. Tightness noted with DF.     Progress per Plan of Care.         Timed:  Manual Therapy:    8     mins  25376;  Therapeutic Exercise:    8     mins  26510;     Therapeutic Activity:     8     mins  69478;     Gait Trainin     mins  16258;         Timed Treatment:   32   mins   Total Treatment:     32   mins        Kylee Dooley PT, DPT  Physical Therapist  PT license: [place] [id number]

## 2025-04-02 ENCOUNTER — TREATMENT (OUTPATIENT)
Dept: PHYSICAL THERAPY | Facility: CLINIC | Age: 25
End: 2025-04-02
Payer: COMMERCIAL

## 2025-04-02 DIAGNOSIS — S93.491D SPRAIN OF ANTERIOR TALOFIBULAR LIGAMENT OF RIGHT ANKLE, SUBSEQUENT ENCOUNTER: ICD-10-CM

## 2025-04-02 DIAGNOSIS — Z47.89 ORTHOPEDIC AFTERCARE: Primary | ICD-10-CM

## 2025-04-02 NOTE — PROGRESS NOTES
Physical Therapy Daily Treatment Note  Visit: 3        Patient: Monica Cerna   : 2000  Diagnosis/ICD-10 Code:  Orthopedic aftercare [Z47.89]  Referring practitioner: Peggy Concepcion DPM  Date of Initial Visit: Type: THERAPY  Noted: 3/17/2025  Today's Date: 2025  Patient seen for 3 sessions       Visit Diagnoses:    ICD-10-CM ICD-9-CM   1. Orthopedic aftercare  Z47.89 V54.9   2. Sprain of anterior talofibular ligament of right ankle, subsequent encounter  S93.491D V58.89     845.09       Subjective     Monica Cerna reports: no longer using the boot during the day. Occasionally at the end of the day when she gets tired and pain increases in R ankle.     Objective   See Exercise, Manual, and Modality Logs for complete treatment.       Assessment/Plan  Progressed into [facility] strengthening and stabilization exercises today with good tolerance. Pt did report pain with resisted inversion so this was stopped. She demo improved gait pattern since discontinuing boot.    Progress per Plan of Care.         Timed:  Manual Therapy:    8     mins  25202;  Therapeutic Exercise:    8     mins  32157;     Neuromuscular Chad:    8    mins  89107;    Therapeutic Activity:     8     mins  98473;     Gait Trainin     mins  13488;       Timed Treatment:   40   mins   Total Treatment:     40   mins        Kylee Dooley PT, GABRIELA  Physical Therapist  PT license: [place] [id number]

## 2025-04-07 ENCOUNTER — TREATMENT (OUTPATIENT)
Dept: PHYSICAL THERAPY | Facility: CLINIC | Age: 25
End: 2025-04-07
Payer: COMMERCIAL

## 2025-04-07 DIAGNOSIS — Z47.89 ORTHOPEDIC AFTERCARE: Primary | ICD-10-CM

## 2025-04-07 DIAGNOSIS — S93.491D SPRAIN OF ANTERIOR TALOFIBULAR LIGAMENT OF RIGHT ANKLE, SUBSEQUENT ENCOUNTER: ICD-10-CM

## 2025-04-07 PROCEDURE — 97116 GAIT TRAINING THERAPY: CPT | Performed by: PHYSICAL THERAPIST

## 2025-04-07 PROCEDURE — 97110 THERAPEUTIC EXERCISES: CPT | Performed by: PHYSICAL THERAPIST

## 2025-04-07 PROCEDURE — 97014 ELECTRIC STIMULATION THERAPY: CPT | Performed by: PHYSICAL THERAPIST

## 2025-04-07 PROCEDURE — 97140 MANUAL THERAPY 1/> REGIONS: CPT | Performed by: PHYSICAL THERAPIST

## 2025-04-07 PROCEDURE — 97112 NEUROMUSCULAR REEDUCATION: CPT | Performed by: PHYSICAL THERAPIST

## 2025-04-07 NOTE — PROGRESS NOTES
Physical Therapy Daily Treatment Note  Visit: 4        Patient: Monica Cerna   : 2000  Diagnosis/ICD-10 Code:  Orthopedic aftercare [Z47.89]  Referring practitioner: Peggy Concepcion DPM  Date of Initial Visit: Type: THERAPY  Noted: 3/17/2025  Today's Date: 2025  Patient seen for 4 sessions       Visit Diagnoses:    ICD-10-CM ICD-9-CM   1. Orthopedic aftercare  Z47.89 V54.9   2. Sprain of anterior talofibular ligament of right ankle, subsequent encounter  S93.491D V58.89     845.09       Subjective     Monica Cerna reports: R ankle is more sore now especially by the end of the day.     Objective   See Exercise, Manual, and Modality Logs for complete treatment.       Assessment/Plan  R ankle soreness likely due to progression out of walking boot and increase in activity. IFC and ice provided at end of session. Working on R LE strengthening and gentle ankle stabilization. Increased tightness noted in R TC joint, but responded well to passive stretching and joint mobilizations.     Progress per Plan of Care.         Timed:  Manual Therapy:    8     mins  44813;  Therapeutic Exercise:    8     mins  34581;     Neuromuscular Chad:    8    mins  72283;    Gait Trainin     mins  97988;       Untimed:  Electrical Stimulation:    15     mins  30177 ( );    Timed Treatment:   32   mins   Total Treatment:     47   mins        Kylee Dooley PT, DPT  Physical Therapist  PT license: [place] [id number]

## 2025-04-10 ENCOUNTER — TREATMENT (OUTPATIENT)
Dept: PHYSICAL THERAPY | Facility: CLINIC | Age: 25
End: 2025-04-10
Payer: COMMERCIAL

## 2025-04-10 DIAGNOSIS — S93.491D SPRAIN OF ANTERIOR TALOFIBULAR LIGAMENT OF RIGHT ANKLE, SUBSEQUENT ENCOUNTER: ICD-10-CM

## 2025-04-10 DIAGNOSIS — Z47.89 ORTHOPEDIC AFTERCARE: Primary | ICD-10-CM

## 2025-04-10 NOTE — PROGRESS NOTES
Physical Therapy Daily Treatment Note  Visit: 5        Patient: Monica Cerna   : 2000  Diagnosis/ICD-10 Code:  Orthopedic aftercare [Z47.89]  Referring practitioner: Peggy Concepcion DPM  Date of Initial Visit: Type: THERAPY  Noted: 3/17/2025  Today's Date: 4/10/2025  Patient seen for 5 sessions       Visit Diagnoses:    ICD-10-CM ICD-9-CM   1. Orthopedic aftercare  Z47.89 V54.9   2. Sprain of anterior talofibular ligament of right ankle, subsequent encounter  S93.491D V58.89     845.09       Subjective     Monica Cerna reports: R ankle was sore this morning, but better currently.     Objective   See Exercise, Manual, and Modality Logs for complete treatment.       Assessment/Plan  Improved TC joint mobility and AROM noted today. Progressing LE strengthening as tolerated. Step ups added for stair training today. No adverse reaction to tx session.     Progress per Plan of Care.         Timed:  Manual Therapy:    8     mins  09591;  Therapeutic Exercise:    8     mins  47553;     Neuromuscular Chad:    8    mins  86945;    Gait Trainin     mins  37267;         Timed Treatment:   32   mins   Total Treatment:     32   mins        Kylee Dooley PT, DPT  Physical Therapist  PT license: [place] [id number]

## 2025-04-15 ENCOUNTER — TREATMENT (OUTPATIENT)
Dept: PHYSICAL THERAPY | Facility: CLINIC | Age: 25
End: 2025-04-15
Payer: COMMERCIAL

## 2025-04-15 DIAGNOSIS — S93.491D SPRAIN OF ANTERIOR TALOFIBULAR LIGAMENT OF RIGHT ANKLE, SUBSEQUENT ENCOUNTER: ICD-10-CM

## 2025-04-15 DIAGNOSIS — Z47.89 ORTHOPEDIC AFTERCARE: Primary | ICD-10-CM

## 2025-04-15 NOTE — PROGRESS NOTES
Physical Therapy Daily Treatment Note  Visit: 6        Patient: Monica Cerna   : 2000  Diagnosis/ICD-10 Code:  Orthopedic aftercare [Z47.89]  Referring practitioner: Peggy Concepcion DPM  Date of Initial Visit: Type: THERAPY  Noted: 3/17/2025  Today's Date: 4/15/2025  Patient seen for 6 sessions       Visit Diagnoses:    ICD-10-CM ICD-9-CM   1. Orthopedic aftercare  Z47.89 V54.9   2. Sprain of anterior talofibular ligament of right ankle, subsequent encounter  S93.491D V58.89     845.09       Subjective     Monica Cerna reports: no reports of pain today in R ankle.    Objective   See Exercise, Manual, and Modality Logs for complete treatment.       Assessment/Plan  Progressed balance training today with BOSU and foam exercises. Pt demo good control and denied any pain post session.     Progress per Plan of Care.         Timed:  Therapeutic Exercise:    8     mins  03844;     Neuromuscular Chad:    23    mins  88225;    Therapeutic Activity:     8     mins  67603;         Timed Treatment:   39   mins   Total Treatment:     39   mins        Kylee Dooley PT, DPT  Physical Therapist  PT license: [place] [id number]

## 2025-04-17 ENCOUNTER — TREATMENT (OUTPATIENT)
Dept: PHYSICAL THERAPY | Facility: CLINIC | Age: 25
End: 2025-04-17
Payer: COMMERCIAL

## 2025-04-17 DIAGNOSIS — Z47.89 ORTHOPEDIC AFTERCARE: Primary | ICD-10-CM

## 2025-04-17 DIAGNOSIS — S93.491D SPRAIN OF ANTERIOR TALOFIBULAR LIGAMENT OF RIGHT ANKLE, SUBSEQUENT ENCOUNTER: ICD-10-CM

## 2025-04-17 NOTE — PROGRESS NOTES
Physical Therapy Daily Treatment Note  Visit: 7        Patient: Monica Cerna   : 2000  Diagnosis/ICD-10 Code:  Orthopedic aftercare [Z47.89]  Referring practitioner: Peggy Concepcion DPM  Date of Initial Visit: Type: THERAPY  Noted: 3/17/2025  Today's Date: 2025  Patient seen for 7 sessions       Visit Diagnoses:    ICD-10-CM ICD-9-CM   1. Orthopedic aftercare  Z47.89 V54.9   2. Sprain of anterior talofibular ligament of right ankle, subsequent encounter  S93.491D V58.89     845.09       Subjective     Monica Cerna reports: no new complaints.    Objective   See Exercise, Manual, and Modality Logs for complete treatment.       Assessment/Plan  Cont to progress balance and LE strengthening today with good tolerance. Pt demo excellent stability with dynamic balance activities. No reports of pain throughout session.     Progress per Plan of Care.         Timed:  Therapeutic Exercise:    8     mins  76534;     Neuromuscular Chad:    23    mins  40750;    Therapeutic Activity:     8     mins  27553;         Timed Treatment:   39   mins   Total Treatment:     39   mins        Kylee Dooley PT, DPT  Physical Therapist  PT license: [place] [id number]

## 2025-04-21 ENCOUNTER — TREATMENT (OUTPATIENT)
Dept: PHYSICAL THERAPY | Facility: CLINIC | Age: 25
End: 2025-04-21
Payer: COMMERCIAL

## 2025-04-21 ENCOUNTER — TELEPHONE (OUTPATIENT)
Dept: INTERNAL MEDICINE | Facility: CLINIC | Age: 25
End: 2025-04-21
Payer: COMMERCIAL

## 2025-04-21 DIAGNOSIS — S93.491D SPRAIN OF ANTERIOR TALOFIBULAR LIGAMENT OF RIGHT ANKLE, SUBSEQUENT ENCOUNTER: ICD-10-CM

## 2025-04-21 DIAGNOSIS — Z47.89 ORTHOPEDIC AFTERCARE: Primary | ICD-10-CM

## 2025-04-21 PROCEDURE — 97014 ELECTRIC STIMULATION THERAPY: CPT | Performed by: PHYSICAL THERAPIST

## 2025-04-21 PROCEDURE — 97116 GAIT TRAINING THERAPY: CPT | Performed by: PHYSICAL THERAPIST

## 2025-04-21 PROCEDURE — 97112 NEUROMUSCULAR REEDUCATION: CPT | Performed by: PHYSICAL THERAPIST

## 2025-04-21 PROCEDURE — 97530 THERAPEUTIC ACTIVITIES: CPT | Performed by: PHYSICAL THERAPIST

## 2025-04-21 NOTE — PROGRESS NOTES
Physical Therapy Daily Treatment Note  Visit: 8        Patient: Monica Cerna   : 2000  Diagnosis/ICD-10 Code:  Orthopedic aftercare [Z47.89]  Referring practitioner: Peggy Concepcion DPM  Date of Initial Visit: Type: THERAPY  Noted: 3/17/2025  Today's Date: 2025  Patient seen for 8 sessions       Visit Diagnoses:    ICD-10-CM ICD-9-CM   1. Orthopedic aftercare  Z47.89 V54.9   2. Sprain of anterior talofibular ligament of right ankle, subsequent encounter  S93.491D V58.89     845.09       Subjective     Monica Cerna reports: mild soreness after last visit. Has been doing more in the gym with similar exercises to PT. MD appt went well and is going back in 3 weeks.    Objective   See Exercise, Manual, and Modality Logs for complete treatment.       Assessment/Plan  Pt demo excellent tolerance to current exercise program with emphasis on balance and stability training of R LE/ ankle. Good carry over and form with all activities. Worked on LE strengthening for improved ease with stairs and walking.     Progress per Plan of Care.         Timed:  Neuromuscular Chad:    23    mins  14524;    Therapeutic Activity:     8     mins  36153;     Gait Trainin     mins  03216;         Untimed:  Electrical Stimulation:    15     mins  61194 ( );    Timed Treatment:   39   mins   Total Treatment:     54   mins        Kylee Dooley PT, DPT  Physical Therapist  PT license: [place] [id number]

## 2025-04-21 NOTE — TELEPHONE ENCOUNTER
Caller: Monica Cerna    Relationship: Self    Best call back number: [phone]    What medication are you requesting: COUGH    What are your current symptoms: COUGH - PATIENT WAS SEEN AT URGENT CARE AND WAS PRESCRIBED A MEDICATION BUT THIS HAS NOT HELPED CLEAR THIS UP. PLEASE ADVISE IF [facility] CAN SEND MEDICATION  OR IF PATIENT WILL NEED TO BE SEEN.     If a prescription is needed, what is your preferred pharmacy and phone number:  [id number] - [place] - [address] [place] [address] - [phone]  - [phone]  [phone]     Additional notes:

## 2025-04-22 DIAGNOSIS — J06.9 UPPER RESPIRATORY TRACT INFECTION, UNSPECIFIED TYPE: Primary | ICD-10-CM

## 2025-04-22 RX ORDER — BENZONATATE 100 MG/1
100 CAPSULE ORAL 3 TIMES DAILY PRN
Qty: 30 CAPSULE | Refills: 0 | Status: SHIPPED | OUTPATIENT
Start: 2025-04-22

## 2025-04-24 ENCOUNTER — TREATMENT (OUTPATIENT)
Dept: PHYSICAL THERAPY | Facility: CLINIC | Age: 25
End: 2025-04-24
Payer: COMMERCIAL

## 2025-04-24 DIAGNOSIS — S93.491D SPRAIN OF ANTERIOR TALOFIBULAR LIGAMENT OF RIGHT ANKLE, SUBSEQUENT ENCOUNTER: ICD-10-CM

## 2025-04-24 DIAGNOSIS — Z47.89 ORTHOPEDIC AFTERCARE: Primary | ICD-10-CM

## 2025-04-24 NOTE — PROGRESS NOTES
Physical Therapy Daily Treatment Note  Visit: 9        Patient: Monica Cerna   : 2000  Diagnosis/ICD-10 Code:  Orthopedic aftercare [Z47.89]  Referring practitioner: Peggy Concepcion DPM  Date of Initial Visit: Type: THERAPY  Noted: 3/17/2025  Today's Date: 2025  Patient seen for 9 sessions       Visit Diagnoses:    ICD-10-CM ICD-9-CM   1. Orthopedic aftercare  Z47.89 V54.9   2. Sprain of anterior talofibular ligament of right ankle, subsequent encounter  S93.491D V58.89     845.09       Subjective     Monica Cerna reports: less pain in R ankle today. Mild stiffness reported.     Objective   See Exercise, Manual, and Modality Logs for complete treatment.       Assessment/Plan  Working on balance and strength training as tolerated. Progressing well towards all goals. Improved stability with SLS noted today.     Progress per Plan of Care.         Timed:  Therapeutic Exercise:    8     mins  94525;     Neuromuscular Chad:    23    mins  84567;    Therapeutic Activity:     8     mins  06282;       Timed Treatment:   39   mins   Total Treatment:     39   mins        Kylee Dooley PT, DPT  Physical Therapist  PT license: [place] [id number]

## 2025-04-28 ENCOUNTER — TREATMENT (OUTPATIENT)
Dept: PHYSICAL THERAPY | Facility: CLINIC | Age: 25
End: 2025-04-28
Payer: COMMERCIAL

## 2025-04-28 DIAGNOSIS — Z47.89 ORTHOPEDIC AFTERCARE: Primary | ICD-10-CM

## 2025-04-28 DIAGNOSIS — S93.491D SPRAIN OF ANTERIOR TALOFIBULAR LIGAMENT OF RIGHT ANKLE, SUBSEQUENT ENCOUNTER: ICD-10-CM

## 2025-04-28 PROCEDURE — 97530 THERAPEUTIC ACTIVITIES: CPT | Performed by: PHYSICAL THERAPIST

## 2025-04-28 PROCEDURE — 97112 NEUROMUSCULAR REEDUCATION: CPT | Performed by: PHYSICAL THERAPIST

## 2025-04-28 PROCEDURE — 97110 THERAPEUTIC EXERCISES: CPT | Performed by: PHYSICAL THERAPIST

## 2025-04-28 NOTE — PROGRESS NOTES
Physical Therapy Daily Treatment Note  Visit: 10        Patient: Monica Cerna   : 2000  Diagnosis/ICD-10 Code:  Orthopedic aftercare [Z47.89]  Referring practitioner: Peggy Concepcion DPM  Date of Initial Visit: Type: THERAPY  Noted: 3/17/2025  Today's Date: 2025  Patient seen for 10 sessions       Visit Diagnoses:    ICD-10-CM ICD-9-CM   1. Orthopedic aftercare  Z47.89 V54.9   2. Sprain of anterior talofibular ligament of right ankle, subsequent encounter  S93.491D V58.89     845.09       Subjective     Monica Cerna reports: having pain in her entire R foot yesterday, but pain has subsided.    Objective   See Exercise, Manual, and Modality Logs for complete treatment.       Assessment/Plan  Improved stability with balance activities noted today. Working on balance and stabilization exercises of R ankle. Pt demo good progress towards all goals and easing pain in R ankle.    Progress per Plan of Care.         Timed:  Therapeutic Exercise:    8     mins  69897;     Neuromuscular Chad:    23    mins  70292;    Therapeutic Activity:     8     mins  58965;         Timed Treatment:   39   mins   Total Treatment:     39   mins        Kylee Dooley PT, DPT  Physical Therapist  PT license: [place] [id number]

## 2025-04-30 ENCOUNTER — TREATMENT (OUTPATIENT)
Dept: PHYSICAL THERAPY | Facility: CLINIC | Age: 25
End: 2025-04-30
Payer: COMMERCIAL

## 2025-04-30 DIAGNOSIS — Z47.89 ORTHOPEDIC AFTERCARE: Primary | ICD-10-CM

## 2025-04-30 DIAGNOSIS — S93.491D SPRAIN OF ANTERIOR TALOFIBULAR LIGAMENT OF RIGHT ANKLE, SUBSEQUENT ENCOUNTER: ICD-10-CM

## 2025-04-30 NOTE — PROGRESS NOTES
Physical Therapy Daily Treatment Note  Visit: 11        Patient: Monica Cerna   : 2000  Diagnosis/ICD-10 Code:  Orthopedic aftercare [Z47.89]  Referring practitioner: Peggy Concepcion DPM  Date of Initial Visit: Type: THERAPY  Noted: 3/17/2025  Today's Date: 2025  Patient seen for 11 sessions       Visit Diagnoses:    ICD-10-CM ICD-9-CM   1. Orthopedic aftercare  Z47.89 V54.9   2. Sprain of anterior talofibular ligament of right ankle, subsequent encounter  S93.491D V58.89     845.09       Subjective     Monica Cerna reports: no new complaints.     Objective   See Exercise, Manual, and Modality Logs for complete treatment.       Assessment/Plan  Progressed balance and LE stabilization exercises today with good tolerance. Pt demo improving strength and stability of R LE.    Progress per Plan of Care.         Timed:  Therapeutic Exercise:    8     mins  67151;     Neuromuscular Chad:    23    mins  03814;    Therapeutic Activity:     8     mins  31754;         Timed Treatment:   39   mins   Total Treatment:     39   mins        Kylee Dooley PT, DPT  Physical Therapist  PT license: [place] [id number]

## 2025-05-08 ENCOUNTER — TREATMENT (OUTPATIENT)
Dept: PHYSICAL THERAPY | Facility: CLINIC | Age: 25
End: 2025-05-08
Payer: COMMERCIAL

## 2025-05-08 DIAGNOSIS — S93.491D SPRAIN OF ANTERIOR TALOFIBULAR LIGAMENT OF RIGHT ANKLE, SUBSEQUENT ENCOUNTER: ICD-10-CM

## 2025-05-08 DIAGNOSIS — Z47.89 ORTHOPEDIC AFTERCARE: Primary | ICD-10-CM

## 2025-05-08 NOTE — PROGRESS NOTES
Physical Therapy Daily Treatment Note  Visit: 12        Patient: Monica Cerna   : 2000  Diagnosis/ICD-10 Code:  Orthopedic aftercare [Z47.89]  Referring practitioner: Peggy Concepcion DPM  Date of Initial Visit: Type: THERAPY  Noted: 3/17/2025  Today's Date: 2025  Patient seen for 12 sessions       Visit Diagnoses:    ICD-10-CM ICD-9-CM   1. Orthopedic aftercare  Z47.89 V54.9   2. Sprain of anterior talofibular ligament of right ankle, subsequent encounter  S93.491D V58.89     845.09       Subjective     Monica Cerna reports: she is doing well today and does not report any new complaints of pain or increased stiffness in her R ankle.    Objective   See Exercise, Manual, and Modality Logs for complete treatment.     Patient consented to observation and treatment by Michelle Butler, SPT, [facility].    Assessment/Plan  Patient did well with progression of exercises focusing on R ankle ROM, strength, and functional movement. Patient did well with balance activities focusing on SLS control on R leg. Patient had increased difficulty with SL on BAPS board and required use of BUE for support. Patient reported increased difficulty with ankle control. Patient demonstrated R foot drag during resisted side steps but improved with verbal cueing. Patient stated she felt good at the end of the session.       Progress per Plan of Care as tolerated.         Timed:  Therapeutic Exercise:    8     mins  70089;     Neuromuscular Chad:    23    mins  56939;    Therapeutic Activity:     14     mins  08649;     Gait Trainin     mins  33160;         Timed Treatment:   53   mins   Total Treatment:     53   mins        Kylee Dooley PT, DPT  Physical Therapist  PT license: [place] [id number]     Michelle Butler  Student Physical Therapist

## 2025-05-12 DIAGNOSIS — J06.9 UPPER RESPIRATORY TRACT INFECTION, UNSPECIFIED TYPE: ICD-10-CM

## 2025-05-13 RX ORDER — BENZONATATE 100 MG/1
100 CAPSULE ORAL 3 TIMES DAILY PRN
Qty: 30 CAPSULE | Refills: 0 | Status: SHIPPED | OUTPATIENT
Start: 2025-05-13

## 2025-05-15 ENCOUNTER — TREATMENT (OUTPATIENT)
Dept: PHYSICAL THERAPY | Facility: CLINIC | Age: 25
End: 2025-05-15
Payer: COMMERCIAL

## 2025-05-15 DIAGNOSIS — Z47.89 ORTHOPEDIC AFTERCARE: Primary | ICD-10-CM

## 2025-05-15 DIAGNOSIS — S93.491D SPRAIN OF ANTERIOR TALOFIBULAR LIGAMENT OF RIGHT ANKLE, SUBSEQUENT ENCOUNTER: ICD-10-CM

## 2025-05-15 NOTE — PROGRESS NOTES
Physical Therapy Daily Treatment Note  Visit: 13        Patient: Monica Cerna   : 2000  Diagnosis/ICD-10 Code:  Orthopedic aftercare [Z47.89]  Referring practitioner: Peggy Concepcion DPM  Date of Initial Visit: Type: THERAPY  Noted: 3/17/2025  Today's Date: 5/15/2025  Patient seen for 13 sessions       Visit Diagnoses:    ICD-10-CM ICD-9-CM   1. Orthopedic aftercare  Z47.89 V54.9   2. Sprain of anterior talofibular ligament of right ankle, subsequent encounter  S93.491D V58.89     845.09       Subjective     Monica Cerna reports: her R ankle is doing well and does not report any new complaints of pain or stiffness. Patient said she was hit in the head at softball practice and may have minor concussion per school .    Objective   See Exercise, Manual, and Modality Logs for complete treatment.     Patient consented to observation and treatment by Michelle Butler, SPT, [facility].    Assessment/Plan  Monica did well with progression of exercises focusing on improved R ankle functional strength and mobility. Checked in with patient often and she did not report onset of N/V or headache during session. Patient improved with SL excursion matrix activity on foam surface without the use of UUE support. Increased ankle strategy observed during SL balance activities, R >L. Patient reported increased difficulty of stability during squats on BOSU ball. Patient said she felt good at the end of the session.    Progress per Plan of Care.         Timed:  Therapeutic Exercise:    8     mins  63554;     Neuromuscular Chad:   23    mins  41358;    Therapeutic Activity:      8     mins  51538;    Gait Trainin     mins  12200;         Timed Treatment:   47   mins   Total Treatment:     47   mins        Kylee Dooley, PT, DPT  Physical Therapist  PT license: [place] [id number]     Michelle Butler  Student Physical Therapist

## 2025-05-22 ENCOUNTER — TREATMENT (OUTPATIENT)
Dept: PHYSICAL THERAPY | Facility: CLINIC | Age: 25
End: 2025-05-22
Payer: COMMERCIAL

## 2025-05-22 DIAGNOSIS — S93.491D SPRAIN OF ANTERIOR TALOFIBULAR LIGAMENT OF RIGHT ANKLE, SUBSEQUENT ENCOUNTER: ICD-10-CM

## 2025-05-22 DIAGNOSIS — Z47.89 ORTHOPEDIC AFTERCARE: Primary | ICD-10-CM

## 2025-05-22 NOTE — PROGRESS NOTES
Physical Therapy Daily Treatment Note  Visit: 14        Patient: Monica Cerna   : 2000  Diagnosis/ICD-10 Code:  Orthopedic aftercare [Z47.89]  Referring practitioner: Peggy Concepcion DPM  Date of Initial Visit: Type: THERAPY  Noted: 3/17/2025  Today's Date: 2025  Patient seen for 14 sessions       Visit Diagnoses:    ICD-10-CM ICD-9-CM   1. Orthopedic aftercare  Z47.89 V54.9   2. Sprain of anterior talofibular ligament of right ankle, subsequent encounter  S93.491D V58.89     845.09       Subjective     Monica Cerna reports: she is doing well today and does not report any new complaints of pain or stiffness. Patient said she is able to start hopping and jogging, per MD.    Objective   See Exercise, Manual, and Modality Logs for complete treatment.     Patient consented to observation and treatment by Michelle Butler SPT, [facility].    Assessment/Plan  Monica did well with progression of exercises focusing on increasing strength and stability of R ankle. Patient was able to increase WB activities and initiation of pylometrics activities. Patient stated she felt hesitant with hopping activities but was able to complete activities and confidence increased with reps. Patient said she felt good at the end of the session.        Progress per Plan of Care.         Timed:  Therapeutic Exercise:    15     mins  09931;     Neuromuscular Chad:    23    mins  98507;    Therapeutic Activity:     8     mins  40007;     Gait Trainin     mins  46963;         Timed Treatment:   54   mins   Total Treatment:     55   mins        Kylee Dooley PT, DPT  Physical Therapist  PT license: [place] [id number]     Michelle Butler  Student Physical Therapist

## 2025-06-02 ENCOUNTER — TREATMENT (OUTPATIENT)
Dept: PHYSICAL THERAPY | Facility: CLINIC | Age: 25
End: 2025-06-02
Payer: COMMERCIAL

## 2025-06-02 DIAGNOSIS — S93.491D SPRAIN OF ANTERIOR TALOFIBULAR LIGAMENT OF RIGHT ANKLE, SUBSEQUENT ENCOUNTER: ICD-10-CM

## 2025-06-02 DIAGNOSIS — Z47.89 ORTHOPEDIC AFTERCARE: Primary | ICD-10-CM

## 2025-06-02 PROCEDURE — 97112 NEUROMUSCULAR REEDUCATION: CPT | Performed by: PHYSICAL THERAPIST

## 2025-06-02 PROCEDURE — 97530 THERAPEUTIC ACTIVITIES: CPT | Performed by: PHYSICAL THERAPIST

## 2025-06-02 PROCEDURE — 97110 THERAPEUTIC EXERCISES: CPT | Performed by: PHYSICAL THERAPIST

## 2025-06-02 PROCEDURE — 97116 GAIT TRAINING THERAPY: CPT | Performed by: PHYSICAL THERAPIST

## 2025-06-02 NOTE — PROGRESS NOTES
Physical Therapy Daily Treatment Note  Visit: 15        Patient: Monica Cerna   : 2000  Diagnosis/ICD-10 Code:  Orthopedic aftercare [Z47.89]  Referring practitioner: Peggy Concepcion DPM  Date of Initial Visit: Type: THERAPY  Noted: 3/17/2025  Today's Date: 2025  Patient seen for 15 sessions       Visit Diagnoses:    ICD-10-CM ICD-9-CM   1. Orthopedic aftercare  Z47.89 V54.9   2. Sprain of anterior talofibular ligament of right ankle, subsequent encounter  S93.491D V58.89     845.09       Subjective     Monica Cerna reports: she is doing well today and feels her ankle is getting stronger but SL tasks remain difficulty.    Objective   See Exercise, Manual, and Modality Logs for complete treatment.     Patient consented to observation and treatment by Michelle Butler SPT, [facility].    Assessment/Plan  Monica did well with progression of exercises focusing on improving strength and ROM of L ankle. Patient reported feelings of instability of L ankle and showed signs of fatigue with increased reps. Patient said she felt good at the end of the session and would like to continue working on SL hopping/ mobility activities.      Progress per Plan of Care.         Timed:  Therapeutic Exercise:    15     mins  52900;     Neuromuscular Chad:    8    mins  85469;    Therapeutic Activity:     8     mins  59486;     Gait Training:      15     mins  59083;         Timed Treatment:   46   mins   Total Treatment:     48   mins        Kylee Dooley PT, DPT  Physical Therapist  PT license: [place] [id number]     Michelle Butler  Student Physical Therapist

## 2025-06-16 ENCOUNTER — TREATMENT (OUTPATIENT)
Dept: PHYSICAL THERAPY | Facility: CLINIC | Age: 25
End: 2025-06-16
Payer: COMMERCIAL

## 2025-06-16 DIAGNOSIS — S93.491D SPRAIN OF ANTERIOR TALOFIBULAR LIGAMENT OF RIGHT ANKLE, SUBSEQUENT ENCOUNTER: ICD-10-CM

## 2025-06-16 DIAGNOSIS — Z47.89 ORTHOPEDIC AFTERCARE: Primary | ICD-10-CM

## 2025-06-17 ENCOUNTER — OFFICE VISIT (OUTPATIENT)
Dept: INTERNAL MEDICINE | Facility: CLINIC | Age: 25
End: 2025-06-17
Payer: COMMERCIAL

## 2025-06-17 VITALS — HEIGHT: 63 IN | BODY MASS INDEX: 29.77 KG/M2 | TEMPERATURE: 98 F | WEIGHT: 168 LBS

## 2025-06-17 DIAGNOSIS — L20.9 ATOPIC DERMATITIS OF SCALP: Primary | ICD-10-CM

## 2025-06-17 PROCEDURE — 99212 OFFICE O/P EST SF 10 MIN: CPT | Performed by: PHYSICIAN ASSISTANT

## 2025-06-17 RX ORDER — CLOBETASOL PROPIONATE 0.05 G/100ML
SHAMPOO TOPICAL 2 TIMES DAILY
Qty: 118 ML | Refills: 0 | Status: SHIPPED | OUTPATIENT
Start: 2025-06-17

## 2025-06-17 NOTE — PROGRESS NOTES
"Physical Therapy Re-Certification of Plan of Care        Patient: Monica Cerna   : 2000  Diagnosis/ICD-10 Code:  Orthopedic aftercare [Z47.89]  Referring practitioner: Peggy Concepcion DPM  Date of Initial Visit: 2025  Today's Date: 2025  Patient seen for 16 sessions      Subjective:   Visit Diagnoses:    ICD-10-CM ICD-9-CM   1. Orthopedic aftercare  Z47.89 V54.9   2. Sprain of anterior talofibular ligament of right ankle, subsequent encounter  S93.491D V58.89     845.09       Monica Cerna reports: R ankle pain is mostly resolved at this time. 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  Subjective Questionnaire: FAAM: 95  Clinical Progress: improved  Home Program Compliance: Yes  Treatment has included: therapeutic exercise, neuromuscular re-education, manual therapy, therapeutic activity, and gait training    Subjective   Objective          Active Range of Motion     Right Ankle/Foot   Dorsiflexion (ke): 10 degrees   Plantar flexion: 55 degrees   Inversion: 35 degrees   Eversion: 12 degrees     Strength/Myotome Testing     Right Ankle/Foot   Dorsiflexion: 4+  Plantar flexion: 4+  Inversion: 4+  Eversion: 4+    Ambulation   Weight-Bearing Status   Weight-Bearing Status (Right):    in walking boot     Observational Gait   Gait: within functional limits     Functional Assessment     Single Leg Squat   Left Leg  Within functional limits.     Right Leg  Valgus.     Forward Step Up 8\"   Left Leg  Within functional limits.     Right Leg  Within functional limits.     Forward Step Down 8\"   Left Leg  Within functional limits.     Right Leg  Within functional limits.     Single Leg Stance   Right: 25 seconds      Assessment & Plan       Assessment  Impairments: abnormal or restricted ROM, impaired balance, impaired physical strength, lacks appropriate home exercise program and pain " with function   Functional limitations: walking, uncomfortable because of pain, sitting, standing, stooping and unable to perform repetitive tasks   Assessment details: Pt has attended 16 visits in skilled PT following R ankle arthroplasty. She has made excellent progression towards all goals. AROM and strength of R ankle are WFL and gait is normalized. However, she remains with deficits with functional strength assessments such as single leg squatting and lateral movements. Balance remains slightly limited. It is recommended to cont skilled PT at 1x per week to allow pt to return to PLOF including running and lateral movements which are needed with her job as a .   Barriers to therapy: none  Prognosis: good    Goals  Plan Goals: ST. Pt will improve R ankle DF AROM to 10 deg in in 2 weeks.MET  2. Pt will demo good tolerance and compliance with HEP in 2 weeks. MET  3. Pt will wean from crutches while using walking boot in 2 weeks. MET    LT.Pt will be independent with HEP in 12 weeks for self management and prevention of re-occurrence. PROGRESSING  2.Pt will improve strength of R ankle to 5/5 in 12 weeks for ease with stairs and stability of R ankle joint. PROGRESSING  3. Pt will demo normalized gait pattern without AD or walking boot in 12 weeks for return to PLOF. MET  4. Pt will demo full AROM of R ankle in all planes in 12 weeks for improved gait pattern and ease with stairs. MET  5. Pt will demo progression to good dynamic balance in 12 weeks to decrease risk of re-injury. PROGRESSING      Plan  Therapy options: will be seen for skilled therapy services  Planned modality interventions: cryotherapy, electrical stimulation/Russian stimulation and thermotherapy (hydrocollator packs)  Planned therapy interventions: flexibility, functional ROM exercises, gait training, home exercise program, joint mobilization, neuromuscular re-education, manual therapy, strengthening, stretching,  therapeutic activities and balance/weight-bearing training  Frequency: 1x week  Duration in weeks: 4  Treatment plan discussed with: patient      Progress toward previous goals: Partially Met        Recommendations: Continue with recommendations wean to 1x per week      Based upon review of the patient's progress and continued therapy plan, it is my medical opinion that Monica Cerna should continue physical therapy treatment at [facility] PHYS[facility]  [address] [place] [address] [phone].    Timed:  Therapeutic Exercise:    8     mins  03765;     Neuromuscular Chad:    8    mins  04343;    Therapeutic Activity:     8     mins  33640;     Gait Trainin     mins  83516;       Untimed:  Re-Eval      8      mins  32497    Timed Treatment:   32   mins   Total Treatment:     40   mins      PT Signature: Kylee Dooley PT, DPT  PT license: [place] [id number]       Certification Period: 2025  I certify that the therapy services are furnished while this patient is under my care.  The services outlined above are required by this patient, and will be reviewed every 90 days.    Physician Signature: _________________________________________    PHYSICIAN: Peggy Concepcion DPM  DATE:

## 2025-06-17 NOTE — PROGRESS NOTES
Subjective   Chief Complaint   Patient presents with    Itching     Head, had lice treated Saturday but head still itches       History of Present Illness     Had head lice treated with otc topicals and also went to [facility] and had her hair treated a few days ago. Still having itching of her scalp. No rash present.      Patient Active Problem List   Diagnosis    Right upper quadrant abdominal pain    Nausea and vomiting       Allergies   Allergen Reactions    Cefdinir Itching       Current Outpatient Medications on File Prior to Visit   Medication Sig Dispense Refill    cetirizine (ZyrTEC Allergy) 10 MG tablet Take 1 tablet by mouth Daily.      Ritu 24 Fe 1-20 MG-MCG(24) per tablet Take 1 tablet by mouth Daily. 84 tablet 3    [DISCONTINUED] Aspirin Low Dose 81 MG chewable tablet       [DISCONTINUED] benzonatate (Tessalon Perles) 100 MG capsule Take 1 capsule by mouth 3 (Three) Times a Day As Needed for Cough. 30 capsule 0    [DISCONTINUED] brompheniramine-pseudoephedrine-DM 30-2-10 MG/5ML syrup       [DISCONTINUED] HYDROcodone-acetaminophen (NORCO) 5-325 MG per tablet       [DISCONTINUED] methylPREDNISolone (MEDROL) 4 MG dose pack Take as directed on package instructions. 21 tablet 0     No current facility-administered medications on file prior to visit.       Past Medical History:   Diagnosis Date    Allergic     Ankle sprain     Brain concussion     Elbow sprain     GERD (gastroesophageal reflux disease)     Peptic ulceration 2021    Urinary tract infection        Family History   Problem Relation Age of Onset    Thyroid disease Mother     Hyperlipidemia Father     Cancer Paternal Grandmother        Social History     Socioeconomic History    Marital status: Single   Tobacco Use    Smoking status: Never     Passive exposure: Never    Smokeless tobacco: Never   Vaping Use    Vaping status: Never Used   Substance and Sexual Activity    Alcohol use: Never    Drug use: Never    Sexual activity: Never       Past  "Surgical History:   Procedure Laterality Date    ANKLE ARTHROPLASTY Right 02/2025    ENDOSCOPY N/A 09/22/2021    Procedure: ESOPHAGOGASTRODUODENOSCOPY;  Surgeon: Román Monroy MD;  [place] [facility] [place]  Service: Gastroenterology;  Laterality: N/A;    KNEE ARTHROSCOPY Right     SEPTOPLASTY      2023    SEPTOPLASTY  06/2023         The following portions of the patient's history were reviewed and updated as appropriate: problem list, allergies, current medications, past medical history, past family history, past social history, and past surgical history.    ROS    See HPI    Immunization History   Administered Date(s) Administered    COVID-19 (LINDSEY) 04/07/2021    DTaP 2001, 2001, 2001, 12/09/2002, 06/03/2005    Flu Vaccine Quad PF 6-35MO 01/27/2017, 10/23/2018    FluMist 2-49yrs (Nasal) 10/14/2008, 09/25/2009    Fluzone (or Fluarix & Flulaval for VFC) >6mos 10/01/2020    H1N1 Nasal 12/21/2009    HPV Quadrivalent 03/24/2015    Hep A, 2 Dose 06/02/2006, 05/29/2007    Hep B, Adolescent or Pediatric 2001, 2001, 05/28/2002    HiB 2001, 2001, 2001, 05/28/2002    Hpv9 03/24/2015, 07/02/2015, 03/29/2016    IPV 2001, 2001, 12/09/2002, 06/03/2005    Influenza Seasonal Injectable 11/19/2005    MMR 09/04/2002, 06/03/2005    Meningococcal B,(Bexsero) 07/23/2018, 07/24/2019    Meningococcal Conjugate 06/08/2012    Meningococcal MCV4P (Menactra) 07/20/2017    Tdap 06/08/2012, 08/10/2022    Varicella 09/04/2002, 05/29/2007       Objective   Vitals:    06/17/25 1406   Temp: 98 °F (36.7 °C)   Weight: 76.2 kg (168 lb)   Height: 160 cm (62.99\")     Body mass index is 29.77 kg/m².  Physical Exam  Vitals reviewed.   Constitutional:       Appearance: Normal appearance.   HENT:      Head: Normocephalic and atraumatic.   Skin:     Comments: Scalp is normal   Neurological:      Mental Status: She is alert.           Assessment & Plan   Diagnoses and all orders for this " visit:    1. Atopic dermatitis of scalp (Primary)  -     clobetasol propionate (CLOBEX) 0.05 % shampoo; Apply  topically to the appropriate area as directed 2 (Two) Times a Day.  Dispense: 118 mL; Refill: 0     Use Clobetasol daily for a few days to help with irritation    No follow-ups on file.

## 2025-07-01 ENCOUNTER — TREATMENT (OUTPATIENT)
Dept: PHYSICAL THERAPY | Facility: CLINIC | Age: 25
End: 2025-07-01
Payer: COMMERCIAL

## 2025-07-01 DIAGNOSIS — S93.491D SPRAIN OF ANTERIOR TALOFIBULAR LIGAMENT OF RIGHT ANKLE, SUBSEQUENT ENCOUNTER: ICD-10-CM

## 2025-07-01 DIAGNOSIS — Z47.89 ORTHOPEDIC AFTERCARE: Primary | ICD-10-CM

## 2025-07-01 NOTE — PROGRESS NOTES
Physical Therapy Daily Treatment Note  Visit: 17        Patient: Monica Cerna   : 2000  Diagnosis/ICD-10 Code:  Orthopedic aftercare [Z47.89]  Referring practitioner: Peggy Concepcion DPM  Date of Initial Visit: Type: THERAPY  Noted: 3/17/2025  Today's Date: 2025  Patient seen for 17 sessions       Visit Diagnoses:    ICD-10-CM ICD-9-CM   1. Orthopedic aftercare  Z47.89 V54.9   2. Sprain of anterior talofibular ligament of right ankle, subsequent encounter  S93.491D V58.89     845.09       Subjective     Monica Cerna reports: she is doing well and has been able to cut, run, and jump without pain or stiffness in R ankle. Patient did report feelings of ankle discomfort after kicking weighted ball to dog for 6-8 reps.    Objective   See Exercise, Manual, and Modality Logs for complete treatment.     Patient consented to observation and treatment by Michelle Butler, SPT, [facility].    Assessment/Plan  Monica did well with progression of exercises focusing on improving overall ROM and functional strength of R ankle. Patient was able to complete agility drills incorporating SL stance, change in direction, and forward and lateral movements to aid in stability during balance activities and gait. Strengthening activities progressed to improve in hip, knee, and ankle stability and strength. Monica said she felt good at the end of the visit.    Progress per Plan of Care.         Timed:  Therapeutic Exercise:    8     mins  28479;     Neuromuscular Chda:    23    mins  81920;    Therapeutic Activity:     8     mins  54997;     Gait Trainin     mins  29366;       Timed Treatment:   47   mins   Total Treatment:     47   mins        Kylee Dooley PT, DPT  Physical Therapist  PT license: [place] [id number]     Michelle Butler  Student Physical Therapist

## 2025-07-09 ENCOUNTER — TREATMENT (OUTPATIENT)
Dept: PHYSICAL THERAPY | Facility: CLINIC | Age: 25
End: 2025-07-09
Payer: COMMERCIAL

## 2025-07-09 DIAGNOSIS — Z47.89 ORTHOPEDIC AFTERCARE: Primary | ICD-10-CM

## 2025-07-09 DIAGNOSIS — S93.491D SPRAIN OF ANTERIOR TALOFIBULAR LIGAMENT OF RIGHT ANKLE, SUBSEQUENT ENCOUNTER: ICD-10-CM

## 2025-07-09 NOTE — PROGRESS NOTES
Physical Therapy Daily Treatment Note  Visit: 18        Patient: Monica Cerna   : 2000  Diagnosis/ICD-10 Code:  Orthopedic aftercare [Z47.89]  Referring practitioner: Peggy Concepcion DPM  Date of Initial Visit: Type: THERAPY  Noted: 3/17/2025  Today's Date: 2025  Patient seen for 18 sessions       Visit Diagnoses:    ICD-10-CM ICD-9-CM   1. Orthopedic aftercare  Z47.89 V54.9   2. Sprain of anterior talofibular ligament of right ankle, subsequent encounter  S93.491D V58.89     845.09       Subjective     Monica Cerna reports: she is doing well and does not report any new complaints of pain or stiffness in R ankle. Patient states her ankle continues to move well.    Objective   See Exercise, Manual, and Modality Logs for complete treatment.     Patient consented to observation and treatment by Michelle Butler, SPT, [facility].    Assessment/Plan  Monica did well with progression of exercises focusing on improving functional strength and mobility of R ankle. Patient was able to complete both SL and DL limb stance endurance and strength activities to improve motor control and stability. Patient did well maintaining proper form with verbal cues. Patient reported R ankle fatigue during balance activities towards end of treatment. Monica said she felt good at the end of the visit.    Progress per Plan of Care.         Timed:  Therapeutic Exercise:    15     mins  39465;     Neuromuscular Chad:    15    mins  78301;    Gait Training:      15     mins  32050;         Timed Treatment:   45   mins   Total Treatment:     45   mins        Osman Duckworth PT, DPT    Michelle Butler  Student Physical Therapist

## 2025-07-11 ENCOUNTER — OFFICE VISIT (OUTPATIENT)
Dept: INTERNAL MEDICINE | Facility: CLINIC | Age: 25
End: 2025-07-11
Payer: COMMERCIAL

## 2025-07-11 VITALS — WEIGHT: 168 LBS | TEMPERATURE: 98 F | HEIGHT: 63 IN | BODY MASS INDEX: 29.77 KG/M2

## 2025-07-11 DIAGNOSIS — L55.9 BURN FROM THE SUN: Primary | ICD-10-CM

## 2025-07-11 PROCEDURE — 99212 OFFICE O/P EST SF 10 MIN: CPT | Performed by: PHYSICIAN ASSISTANT
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
## 2025-07-15 ENCOUNTER — TREATMENT (OUTPATIENT)
Dept: PHYSICAL THERAPY | Facility: CLINIC | Age: 25
End: 2025-07-15
Payer: COMMERCIAL

## 2025-07-15 DIAGNOSIS — Z47.89 ORTHOPEDIC AFTERCARE: Primary | ICD-10-CM

## 2025-07-15 DIAGNOSIS — S93.491D SPRAIN OF ANTERIOR TALOFIBULAR LIGAMENT OF RIGHT ANKLE, SUBSEQUENT ENCOUNTER: ICD-10-CM

## 2025-07-15 NOTE — PROGRESS NOTES
Physical Therapy Daily Treatment Note  Visit: 19        Patient: Monica Cerna   : 2000  Diagnosis/ICD-10 Code:  Orthopedic aftercare [Z47.89]  Referring practitioner: Peggy Concepcion DPM  Date of Initial Visit: Type: THERAPY  Noted: 3/17/2025  Today's Date: 7/15/2025  Patient seen for 19 sessions       Visit Diagnoses:    ICD-10-CM ICD-9-CM   1. Orthopedic aftercare  Z47.89 V54.9   2. Sprain of anterior talofibular ligament of right ankle, subsequent encounter  S93.491D V58.89     845.09       Subjective     Monica Cerna reports: her R ankle is feeling good and does not report any new problems or complaints of pain or stiffness.    Objective   See Exercise, Manual, and Modality Logs for complete treatment.     Patient consented to observation and treatment by Michelle Butler, SPT, [facility].    Assessment/Plan  Monica did well with progession of exercises focusing on improving stability and  mobility of R ankle. Incorporated activities to prepare for patient's active lifestyle including activities involving change in direction, both SL and BL stability, and balance in different planes of motion. Patient did well and was able to complete all activities. Patient showed signs of fatigue with decreased speed of movement with increased reps. Patient said she is progressing well and feels comfortable continuing HEP independently. Patient was d/c due to the above findings.       Timed:  Therapeutic Exercise:    8     mins  52775;     Neuromuscular Chad:    23    mins  53788;    Therapeutic Activity:      8     mins  47124;     Gait Trainin     mins  58240;        Timed Treatment:   47   mins   Total Treatment:     47   mins        Kylee Dooley PT, DPT  Physical Therapist  PT license: [id number]     Michelle Butler  Student Physical Therapist

## 2025-08-25 ENCOUNTER — OFFICE VISIT (OUTPATIENT)
Dept: INTERNAL MEDICINE | Facility: CLINIC | Age: 25
End: 2025-08-25
Payer: COMMERCIAL

## 2025-08-25 VITALS
TEMPERATURE: 98.1 F | DIASTOLIC BLOOD PRESSURE: 74 MMHG | HEART RATE: 79 BPM | OXYGEN SATURATION: 99 % | SYSTOLIC BLOOD PRESSURE: 127 MMHG

## 2025-08-25 DIAGNOSIS — H66.90 ACUTE OTITIS MEDIA, UNSPECIFIED OTITIS MEDIA TYPE: Primary | ICD-10-CM

## 2025-08-25 PROCEDURE — 99213 OFFICE O/P EST LOW 20 MIN: CPT | Performed by: PHYSICIAN ASSISTANT

## 2025-08-25 RX ORDER — AZITHROMYCIN 250 MG/1
TABLET, FILM COATED ORAL
Qty: 6 TABLET | Refills: 0 | Status: SHIPPED | OUTPATIENT
Start: 2025-08-25

## (undated) DEVICE — FLEX ADVANTAGE 1500CC: Brand: FLEX ADVANTAGE

## (undated) DEVICE — MSK ENDO PORT O2 POM ELITE CURAPLEX A/

## (undated) DEVICE — GOWN PROC ENDOARMOR GI LVL3 HY/SHLD UNIV

## (undated) DEVICE — BITEBLOCK OMNI BLOC

## (undated) DEVICE — SINGLE-USE BIOPSY FORCEPS: Brand: RADIAL JAW 4

## (undated) DEVICE — VIAL FORMLN CAP 10PCT 20ML

## (undated) DEVICE — KT ORCA ORCAPOD DISP STRL

## (undated) DEVICE — Device